# Patient Record
Sex: MALE | Race: BLACK OR AFRICAN AMERICAN | NOT HISPANIC OR LATINO | Employment: FULL TIME | URBAN - METROPOLITAN AREA
[De-identification: names, ages, dates, MRNs, and addresses within clinical notes are randomized per-mention and may not be internally consistent; named-entity substitution may affect disease eponyms.]

---

## 2021-06-16 ENCOUNTER — OFFICE VISIT (OUTPATIENT)
Dept: FAMILY MEDICINE | Facility: CLINIC | Age: 26
End: 2021-06-16

## 2021-06-16 ENCOUNTER — MYC MEDICAL ADVICE (OUTPATIENT)
Dept: FAMILY MEDICINE | Facility: CLINIC | Age: 26
End: 2021-06-16

## 2021-06-16 VITALS
SYSTOLIC BLOOD PRESSURE: 102 MMHG | OXYGEN SATURATION: 98 % | TEMPERATURE: 99 F | WEIGHT: 99 LBS | HEART RATE: 64 BPM | DIASTOLIC BLOOD PRESSURE: 63 MMHG | BODY MASS INDEX: 16.5 KG/M2 | HEIGHT: 65 IN | RESPIRATION RATE: 20 BRPM

## 2021-06-16 DIAGNOSIS — Z11.4 ENCOUNTER FOR SCREENING FOR HIV: ICD-10-CM

## 2021-06-16 DIAGNOSIS — Z29.9 ENCOUNTER FOR PREVENTIVE MEASURE: ICD-10-CM

## 2021-06-16 DIAGNOSIS — L43.9 LICHEN PLANUS: Primary | ICD-10-CM

## 2021-06-16 DIAGNOSIS — R63.0 ANOREXIA: ICD-10-CM

## 2021-06-16 LAB
BASOPHILS # BLD AUTO: 0 10E9/L (ref 0–0.2)
BASOPHILS NFR BLD AUTO: 0.2 %
DIFFERENTIAL METHOD BLD: NORMAL
EOSINOPHIL # BLD AUTO: 0 10E9/L (ref 0–0.7)
EOSINOPHIL NFR BLD AUTO: 0.3 %
ERYTHROCYTE [DISTWIDTH] IN BLOOD BY AUTOMATED COUNT: 11.1 % (ref 10–15)
HCT VFR BLD AUTO: 47.2 % (ref 40–53)
HGB BLD-MCNC: 16 G/DL (ref 13.3–17.7)
LYMPHOCYTES # BLD AUTO: 1 10E9/L (ref 0.8–5.3)
LYMPHOCYTES NFR BLD AUTO: 17 %
MCH RBC QN AUTO: 31.7 PG (ref 26.5–33)
MCHC RBC AUTO-ENTMCNC: 33.9 G/DL (ref 31.5–36.5)
MCV RBC AUTO: 94 FL (ref 78–100)
MONOCYTES # BLD AUTO: 0.3 10E9/L (ref 0–1.3)
MONOCYTES NFR BLD AUTO: 5.9 %
NEUTROPHILS # BLD AUTO: 4.4 10E9/L (ref 1.6–8.3)
NEUTROPHILS NFR BLD AUTO: 76.6 %
PLATELET # BLD AUTO: 195 10E9/L (ref 150–450)
RBC # BLD AUTO: 5.05 10E12/L (ref 4.4–5.9)
WBC # BLD AUTO: 5.8 10E9/L (ref 4–11)

## 2021-06-16 PROCEDURE — 99204 OFFICE O/P NEW MOD 45 MIN: CPT | Performed by: FAMILY MEDICINE

## 2021-06-16 PROCEDURE — 84134 ASSAY OF PREALBUMIN: CPT | Performed by: FAMILY MEDICINE

## 2021-06-16 PROCEDURE — 86803 HEPATITIS C AB TEST: CPT | Performed by: FAMILY MEDICINE

## 2021-06-16 PROCEDURE — 80050 GENERAL HEALTH PANEL: CPT | Performed by: FAMILY MEDICINE

## 2021-06-16 PROCEDURE — 87389 HIV-1 AG W/HIV-1&-2 AB AG IA: CPT | Performed by: FAMILY MEDICINE

## 2021-06-16 PROCEDURE — 36415 COLL VENOUS BLD VENIPUNCTURE: CPT | Performed by: FAMILY MEDICINE

## 2021-06-16 RX ORDER — PANTOPRAZOLE SODIUM 40 MG/1
40 TABLET, DELAYED RELEASE ORAL DAILY
Qty: 30 TABLET | Refills: 1 | Status: SHIPPED | OUTPATIENT
Start: 2021-06-16 | End: 2021-10-19

## 2021-06-16 SDOH — HEALTH STABILITY: MENTAL HEALTH: HOW OFTEN DO YOU HAVE 6 OR MORE DRINKS ON ONE OCCASION?: NEVER

## 2021-06-16 SDOH — HEALTH STABILITY: MENTAL HEALTH: HOW MANY STANDARD DRINKS CONTAINING ALCOHOL DO YOU HAVE ON A TYPICAL DAY?: NOT ASKED

## 2021-06-16 SDOH — HEALTH STABILITY: MENTAL HEALTH: HOW OFTEN DO YOU HAVE A DRINK CONTAINING ALCOHOL?: NEVER

## 2021-06-16 ASSESSMENT — MIFFLIN-ST. JEOR: SCORE: 1359.06

## 2021-06-16 NOTE — ASSESSMENT & PLAN NOTE
Patient reports either 2 months or 3 days of anorexia following his a.m. meal.  He has had vomiting at least twice.  He is underweight.  Broaden database, treat with PPI empirically.

## 2021-06-16 NOTE — PROGRESS NOTES
Assessment & Plan   Problem List Items Addressed This Visit     Anorexia     Patient reports either 2 months or 3 days of anorexia following his a.m. meal.  He has had vomiting at least twice.  He is underweight.  [employer] database, treat with PPI empirically.         Relevant Medications    pantoprazole (PROTONIX) 40 MG EC tablet    Other Relevant Orders    TSH with free T4 reflex (Completed)    CBC with platelets and differential (Completed)    *UA reflex to Microscopic and Culture ([place] and [facility] (except [place] and [place]) (Completed)    Comprehensive metabolic panel (Completed)    Prealbumin (Completed)    Encounter for preventive measure     Routine         Relevant Orders    Chlamydia trachomatis PCR (Completed)    HIV Antigen Antibody Combo (Completed)    Hepatitis C antibody (Completed)    Encounter for screening for HIV     Patient requests this.         Lichen planus - Primary     Extensive skin lesions.  [employer] database, refer         Relevant Orders    ADULT DERMATOLOGY REFERRAL                        Return in about 2 months (around 8/16/2021) for COVID vacc #1 lab appt go.    Valentin Dominguez MD  [facility] VIRGIE Betancourt is a 26 year old who presents for the following health issues     HPI     Rash  Onset/Duration: 6 months  Description  Location: All over body  Character: round, blotchy, raised, painful  Itching: moderate  Intensity:  moderate  Progression of Symptoms:  worsening  Accompanying signs and symptoms:   Fever: no  Body aches or joint pain: no  Sore throat symptoms: some times in morniong  Recent cold symptoms: no  History:           [place]  New exposures:  None  Recent travel: no  Exposure to similar rash: no  Precipitating or alleviating factors: none  Therapies tried and outcome: none        Review of Systems     No fevers.  Rashes been present 2 years, sometimes disappears.  No  "therapies tried.  Occasional itch      Objective    /63 (BP Location: Right arm, Patient Position: Chair, Cuff Size: Adult Regular)   Pulse 64   Temp 99  F (37.2  C) (Oral)   Resp 20   Ht 1.64 m (5' 4.57\")   Wt 44.9 kg (99 lb)   SpO2 98%   BMI 16.70 kg/m    Body mass index is 16.7 kg/m .  Physical Exam     No thyromegaly extensive skin lesions with marginal raised edges and central atrophy    Valentin Dominguez MD                  "

## 2021-06-17 LAB
ALBUMIN SERPL-MCNC: 4.4 G/DL (ref 3.4–5)
ALP SERPL-CCNC: 66 U/L (ref 40–150)
ALT SERPL W P-5'-P-CCNC: 22 U/L (ref 0–70)
ANION GAP SERPL CALCULATED.3IONS-SCNC: 7 MMOL/L (ref 3–14)
AST SERPL W P-5'-P-CCNC: 25 U/L (ref 0–45)
BILIRUB SERPL-MCNC: 1.7 MG/DL (ref 0.2–1.3)
BUN SERPL-MCNC: 11 MG/DL (ref 7–30)
CALCIUM SERPL-MCNC: 9.5 MG/DL (ref 8.5–10.1)
CHLORIDE SERPL-SCNC: 104 MMOL/L (ref 94–109)
CO2 SERPL-SCNC: 28 MMOL/L (ref 20–32)
CREAT SERPL-MCNC: 0.88 MG/DL (ref 0.66–1.25)
GFR SERPL CREATININE-BSD FRML MDRD: >90 ML/MIN/{1.73_M2}
GLUCOSE SERPL-MCNC: 107 MG/DL (ref 70–99)
HCV AB SERPL QL IA: NONREACTIVE
HIV 1+2 AB+HIV1 P24 AG SERPL QL IA: NONREACTIVE
POTASSIUM SERPL-SCNC: 4 MMOL/L (ref 3.4–5.3)
PROT SERPL-MCNC: 8.3 G/DL (ref 6.8–8.8)
SODIUM SERPL-SCNC: 139 MMOL/L (ref 133–144)
TSH SERPL DL<=0.005 MIU/L-ACNC: 1.98 MU/L (ref 0.4–4)

## 2021-06-18 LAB — PREALB SERPL IA-MCNC: 24 MG/DL (ref 15–45)

## 2021-06-27 ENCOUNTER — HEALTH MAINTENANCE LETTER (OUTPATIENT)
Age: 26
End: 2021-06-27

## 2021-10-12 ENCOUNTER — MYC MEDICAL ADVICE (OUTPATIENT)
Dept: FAMILY MEDICINE | Facility: CLINIC | Age: 26
End: 2021-10-12

## 2021-10-13 NOTE — TELEPHONE ENCOUNTER
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  Do you want him to come for recheck or refer to GI?  He was referred to derm for skin.  Please advise.  Jolanta Louis RN

## 2021-10-17 ENCOUNTER — HEALTH MAINTENANCE LETTER (OUTPATIENT)
Age: 26
End: 2021-10-17

## 2021-10-19 ENCOUNTER — OFFICE VISIT (OUTPATIENT)
Dept: FAMILY MEDICINE | Facility: CLINIC | Age: 26
End: 2021-10-19
Payer: COMMERCIAL

## 2021-10-19 VITALS
WEIGHT: 103 LBS | TEMPERATURE: 97.3 F | OXYGEN SATURATION: 99 % | BODY MASS INDEX: 17.37 KG/M2 | SYSTOLIC BLOOD PRESSURE: 102 MMHG | RESPIRATION RATE: 12 BRPM | HEART RATE: 67 BPM | DIASTOLIC BLOOD PRESSURE: 58 MMHG

## 2021-10-19 DIAGNOSIS — R10.32 LLQ ABDOMINAL PAIN: Primary | ICD-10-CM

## 2021-10-19 LAB
BASOPHILS # BLD AUTO: 0 10E3/UL (ref 0–0.2)
BASOPHILS NFR BLD AUTO: 0 %
EOSINOPHIL # BLD AUTO: 0 10E3/UL (ref 0–0.7)
EOSINOPHIL NFR BLD AUTO: 1 %
ERYTHROCYTE [DISTWIDTH] IN BLOOD BY AUTOMATED COUNT: 11 % (ref 10–15)
HCT VFR BLD AUTO: 44.4 % (ref 40–53)
HGB BLD-MCNC: 15.1 G/DL (ref 13.3–17.7)
LYMPHOCYTES # BLD AUTO: 1.4 10E3/UL (ref 0.8–5.3)
LYMPHOCYTES NFR BLD AUTO: 27 %
MCH RBC QN AUTO: 31.3 PG (ref 26.5–33)
MCHC RBC AUTO-ENTMCNC: 34 G/DL (ref 31.5–36.5)
MCV RBC AUTO: 92 FL (ref 78–100)
MONOCYTES # BLD AUTO: 0.5 10E3/UL (ref 0–1.3)
MONOCYTES NFR BLD AUTO: 9 %
NEUTROPHILS # BLD AUTO: 3.2 10E3/UL (ref 1.6–8.3)
NEUTROPHILS NFR BLD AUTO: 63 %
PLATELET # BLD AUTO: 182 10E3/UL (ref 150–450)
RBC # BLD AUTO: 4.82 10E6/UL (ref 4.4–5.9)
WBC # BLD AUTO: 5.1 10E3/UL (ref 4–11)

## 2021-10-19 PROCEDURE — 85025 COMPLETE CBC W/AUTO DIFF WBC: CPT | Performed by: PHYSICIAN ASSISTANT

## 2021-10-19 PROCEDURE — 36415 COLL VENOUS BLD VENIPUNCTURE: CPT | Performed by: PHYSICIAN ASSISTANT

## 2021-10-19 PROCEDURE — 99213 OFFICE O/P EST LOW 20 MIN: CPT | Performed by: PHYSICIAN ASSISTANT

## 2021-10-19 NOTE — PROGRESS NOTES
Assessment & Plan     LLQ abdominal pain    Get CBC. If normal, can get US. If abnormal WBC , will get CT scan.    - CBC with platelets and differential; Future  - CBC with platelets and differential                 No follow-ups on file.    YAHAIRA Olsen [facility]    Subjective     Serafin Graham is a 26 year old male who presents to clinic today for the following health issues:    History of Present Illness       He eats 2-3 servings of fruits and vegetables daily.He consumes 2 sweetened beverage(s) daily.He exercises with enough effort to increase his heart rate 9 or less minutes per day.  He exercises with enough effort to increase his heart rate 3 or less days per week.   He is taking medications regularly.       Abdominal/Flank Pain  Onset/Duration: 2 months  Description:   Character: unable to explain  Location: left lower quadrant  Radiation: None  Intensity: mild  Progression of Symptoms:  worsening  Accompanying Signs & Symptoms:  Fever/Chills: no  Gas/Bloating: no  Nausea: no  Vomitting: no  Diarrhea: no  Constipation: no  Dysuria or Hematuria: no  History:   Trauma: no  Previous similar pain: no  Previous tests done: none  Precipitating factors:   Does the pain change with:     Food: no    Bowel Movement: no    Urination: no   Other factors: worse when laying on left side  Therapies tried and outcome: None- tried protonix previously which was effective      Concern - Follow up Lichen Planus  Onset:  Has been going on since June  Description: sx the worse on abdomen, painful  Intensity: moderate  Progression of Symptoms:  worsening  Therapies tried and outcome: none        Review of Systems   Constitutional, HEENT, cardiovascular, pulmonary, gi and gu systems are negative, except as otherwise noted.        Objective    /58 (BP Location: Right arm, Patient Position: Chair, Cuff Size: Adult Regular)   Pulse 67   Temp 97.3  F (36.3  C) (Oral)   Resp 12    Wt 46.7 kg (103 lb)   SpO2 99%   BMI 17.37 kg/m    Body mass index is 17.37 kg/m .       Physical Exam   GENERAL: healthy, alert and no distress  EYES: Eyes grossly normal to inspection, PERRL and conjunctivae and sclerae normal  RESP: lungs clear to auscultation - no rales, rhonchi or wheezes  CV: regular rate and rhythm, normal S1 S2, no S3 or S4, no murmur, click or rub, no peripheral edema and peripheral pulses strong  ABDOMEN: Mild LLQ pain. No guarding or rebound tenderness. Otherwise soft, nontender, no hepatosplenomegaly, no masses and bowel sounds normal  MS: no gross musculoskeletal defects noted, no edema  SKIN: Lichen planus diffusely on stomach. no other suspicious lesions or rashes  NEURO: Normal strength and tone, mentation intact and speech normal  PSYCH: mentation appears normal, affect normal/bright

## 2021-10-27 ENCOUNTER — HOSPITAL ENCOUNTER (OUTPATIENT)
Dept: ULTRASOUND IMAGING | Facility: CLINIC | Age: 26
Discharge: HOME | End: 2021-10-27
Attending: PHYSICIAN ASSISTANT | Admitting: PHYSICIAN ASSISTANT
Payer: COMMERCIAL

## 2021-10-27 DIAGNOSIS — R10.32 LLQ ABDOMINAL PAIN: ICD-10-CM

## 2021-10-27 PROCEDURE — 76705 ECHO EXAM OF ABDOMEN: CPT

## 2022-02-06 ENCOUNTER — HEALTH MAINTENANCE LETTER (OUTPATIENT)
Age: 27
End: 2022-02-06

## 2022-03-17 ENCOUNTER — OFFICE VISIT (OUTPATIENT)
Dept: URGENT CARE | Facility: URGENT CARE | Age: 27
End: 2022-03-17
Payer: COMMERCIAL

## 2022-03-17 VITALS
HEART RATE: 75 BPM | SYSTOLIC BLOOD PRESSURE: 136 MMHG | WEIGHT: 104 LBS | OXYGEN SATURATION: 99 % | RESPIRATION RATE: 17 BRPM | DIASTOLIC BLOOD PRESSURE: 71 MMHG | TEMPERATURE: 98.7 F | BODY MASS INDEX: 17.54 KG/M2

## 2022-03-17 DIAGNOSIS — L43.9 LICHEN PLANUS: Primary | ICD-10-CM

## 2022-03-17 DIAGNOSIS — R07.89 CHEST TIGHTNESS: ICD-10-CM

## 2022-03-17 LAB
BASOPHILS # BLD AUTO: 0 10E3/UL (ref 0–0.2)
BASOPHILS NFR BLD AUTO: 0 %
EOSINOPHIL # BLD AUTO: 0 10E3/UL (ref 0–0.7)
EOSINOPHIL NFR BLD AUTO: 1 %
ERYTHROCYTE [DISTWIDTH] IN BLOOD BY AUTOMATED COUNT: 11.3 % (ref 10–15)
HCT VFR BLD AUTO: 46.5 % (ref 40–53)
HGB BLD-MCNC: 15.5 G/DL (ref 13.3–17.7)
LYMPHOCYTES # BLD AUTO: 1.2 10E3/UL (ref 0.8–5.3)
LYMPHOCYTES NFR BLD AUTO: 32 %
MCH RBC QN AUTO: 31.4 PG (ref 26.5–33)
MCHC RBC AUTO-ENTMCNC: 33.3 G/DL (ref 31.5–36.5)
MCV RBC AUTO: 94 FL (ref 78–100)
MONOCYTES # BLD AUTO: 0.3 10E3/UL (ref 0–1.3)
MONOCYTES NFR BLD AUTO: 7 %
NEUTROPHILS # BLD AUTO: 2.1 10E3/UL (ref 1.6–8.3)
NEUTROPHILS NFR BLD AUTO: 59 %
PLATELET # BLD AUTO: 197 10E3/UL (ref 150–450)
RBC # BLD AUTO: 4.93 10E6/UL (ref 4.4–5.9)
TROPONIN I SERPL HS-MCNC: 4 NG/L
WBC # BLD AUTO: 3.6 10E3/UL (ref 4–11)

## 2022-03-17 PROCEDURE — 99215 OFFICE O/P EST HI 40 MIN: CPT | Performed by: NURSE PRACTITIONER

## 2022-03-17 PROCEDURE — 93000 ELECTROCARDIOGRAM COMPLETE: CPT | Performed by: NURSE PRACTITIONER

## 2022-03-17 PROCEDURE — 85025 COMPLETE CBC W/AUTO DIFF WBC: CPT | Performed by: NURSE PRACTITIONER

## 2022-03-17 PROCEDURE — 84484 ASSAY OF TROPONIN QUANT: CPT | Performed by: NURSE PRACTITIONER

## 2022-03-17 PROCEDURE — 36415 COLL VENOUS BLD VENIPUNCTURE: CPT | Performed by: NURSE PRACTITIONER

## 2022-03-17 RX ORDER — METHYLPREDNISOLONE 4 MG
TABLET, DOSE PACK ORAL
Qty: 21 TABLET | Refills: 0 | Status: SHIPPED | OUTPATIENT
Start: 2022-03-17 | End: 2022-05-04

## 2022-03-17 NOTE — PROGRESS NOTES
Chief Complaint   Patient presents with     Urgent Care     itchy rash over various parts of body for 3 years.     Urgent Care     chest pain off/on for three weeks.         ICD-10-CM    1. Lichen planus  L43.9 Adult Dermatology Referral     methylPREDNISolone (MEDROL DOSEPAK) 4 MG tablet therapy pack   2. Chest tightness  R07.89 EKG 12-lead complete w/read - Clinics     Troponin I     CBC with platelets and differential     Troponin I     CBC with platelets and differential     Will treat lichen planus with a short course of steroids and have him follow-up with dermatology for ongoing treatment of this chronic disease.    No evidence that chest tightness is cardiac related.  Suspicious for anxiety.  Follow-up with your primary care provider.    42 minutes spent on the date of the encounter doing chart review, history and exam, documentation and further activities per the note    EKG - Reviewed and interpreted by me appears normal, NSR, no acute changes.    Results for orders placed or performed in visit on 03/17/22 (from the past 24 hour(s))   Troponin I   Result Value Ref Range    Troponin I High Sensitivity 4 <79 ng/L   CBC with platelets and differential    Narrative    The following orders were created for panel order CBC with platelets and differential.  Procedure                               Abnormality         Status                     ---------                               -----------         ------                     CBC with platelets and d...[[id number]]  Abnormal            Final result                 Please view results for these tests on the individual orders.   CBC with platelets and differential   Result Value Ref Range    WBC Count 3.6 (L) 4.0 - 11.0 10e3/uL    RBC Count 4.93 4.40 - 5.90 10e6/uL    Hemoglobin 15.5 13.3 - 17.7 g/dL    Hematocrit 46.5 40.0 - 53.0 %    MCV 94 78 - 100 fL    MCH 31.4 26.5 - 33.0 pg    MCHC 33.3 31.5 - 36.5 g/dL    RDW 11.3 10.0 - 15.0 %    Platelet Count 197 150 -  450 10e3/uL    % Neutrophils 59 %    % Lymphocytes 32 %    % Monocytes 7 %    % Eosinophils 1 %    % Basophils 0 %    Absolute Neutrophils 2.1 1.6 - 8.3 10e3/uL    Absolute Lymphocytes 1.2 0.8 - 5.3 10e3/uL    Absolute Monocytes 0.3 0.0 - 1.3 10e3/uL    Absolute Eosinophils 0.0 0.0 - 0.7 10e3/uL    Absolute Basophils 0.0 0.0 - 0.2 10e3/uL       Subjective     Taylor Betanocurt is an 26 year old male who presents to clinic today for rash over entire body that has been present intermittently for years.  The rash never completely goes away but will clear from one area of the body and then reappear on another.  He was seen for this in the past and diagnosed with lichen planus.  He was supposed to follow-up with dermatology but did not make the appointment.    He is also concerned about intermittent chest tightness that he has been having over the last couple of weeks.  It seems to mostly appear in the morning and then clears.      ROS: 10 point ROS neg other than the symptoms noted above in the HPI.       Objective    /71   Pulse 75   Temp 98.7  F (37.1  C) (Tympanic)   Resp 17   Wt 47.2 kg (104 lb)   SpO2 99%   BMI 17.54 kg/m    Nurses notes and VS have been reviewed.    Physical Exam       GENERAL APPEARANCE: healthy appearing, alert     EYES: PERRL, EOMI, sclera non-icteric     HENT: oral exam benign, mucus membranes intact, without ulcers or lesions     NECK: no adenopathy or asymmetry, thyroid normal to palpation     RESP: lungs clear to auscultation - no rales, rhonchi or wheezes     CV: regular rates and rhythm, no murmurs, rubs, or gallop     ABDOMEN:  soft, nontender, no HSM or masses and bowel sounds normal     MS: extremities normal- no gross deformities noted; normal muscle tone.     SKIN: Lichen planus on torso arms and legs     NEURO: Normal strength and tone, mentation intact and speech normal     PSYCH: normal thought process; no significant mood disturbance    Patient Instructions      Patient Education     Understanding Lichen Planus  Lichen planus is a long-term (chronic) skin disease. It s a rash. It is not contagious, so it doesn't spread from person to person. It is not cancer (benign). The rash can develop anywhere on the body. But it most often occurs on the wrists, arms, legs, scalp, and genitals. It may also be seen on the nails and in the mouth. People ages 30 to 60 are more likely to get it.    How to say it  LY-osman PLAY -nuhs  What causes lichen planus?   The cause of lichen planus is often not known. But metals such as gold may trigger it. So may some medicines. These may include blood pressure, heart and arthritis medicines, and medicines to prevent malaria. Some research suggests the disease may also be linked to hepatitis C.   Symptoms of lichen planus      On the skin. Lichen planus causes flat-topped bumps or patches to form. These bumps may be very itchy. They are often shiny reddish or purple in color. They are often straight-edged (or polygonal), not round. They may also have fine white lines on them. Over time, the bumps may form thick patches of rough, scaly skin.    In the mouth. The condition may look like patches of white lace. These are often not painful.    On the genitals. The skin here becomes bright red and raw. Sometimes sores can appear. These can make sex painful.    On the nails. It can cause the nails to become thin, split, and form grooves.    Treatment for lichen planus  Lichen planus often goes away in a few years. It may go away without treatment. To ease itching and improve the look of the rash, treatment options may include:     Steroids. These medicines can be put directly onto the skin or injected into the affected area. They can also be taken by mouth.    Other medicines. Your healthcare provider may give you other medicines, such as an antihistamine or retinoid, to ease itching and pain. Anti-itch creams or ointments may also work. Your provider  might also prescribe other medicines that suppress the immune system.    Phototherapy. This treatment directs ultraviolet light on the skin to help clear it.  Self-care tips for lichen planus    Don t scratch any affected areas. This can sometimes spread the rash.    Use mild soap and moisturizing lotion after bathing.    Having lichen planus in your mouth may raise your risk of getting cancer in your mouth (oral cancer). To reduce that risk:  ? Stop smoking, chewing tobacco, and drinking alcohol.  ? Get an oral cancer screening every 6 to 12 months. You can get this from your dentist or dermatologist.  ? Brush your teeth twice a day.  ? Floss every day.  ? Get a dental checkup and cleaning twice a year.  ? Don't have foods or drinks that can make lichen planus in the mouth worse. This includes spicy foods, citrus fruits and juices (such as oranges and grapefruits), tomatoes and foods made with tomatoes (such as salsa, pasta sauces, and ketchup), crispy and salty snacks (such as corn chips), and drinks that have caffeine (such as coffee, tea, and cola).    When to call your healthcare provider  Call your healthcare provider right away if you have any of these:    Fever of 100.4 F (38 C) or higher, or as directed by your provider    New symptoms    Pain that gets worse    Symptoms that don t get better, or get worse    Mouth sores  [employer] last reviewed this educational content on 6/1/2019 2000-2021 [employer]. All rights reserved. This information is not intended as a substitute for professional medical care. Always follow your healthcare professional's instructions.               RAVINDER Navarro, CNP  [facility]    The use of Dragon/[employer] dictation services may have been used to construct the content in this note; any grammatical or spelling errors are non-intentional. Please contact the author of this note directly if you are in need of any clarification.

## 2022-03-17 NOTE — PATIENT INSTRUCTIONS
Patient Education     Understanding Lichen Planus  Lichen planus is a long-term (chronic) skin disease. It s a rash. It is not contagious, so it doesn't spread from person to person. It is not cancer (benign). The rash can develop anywhere on the body. But it most often occurs on the wrists, arms, legs, scalp, and genitals. It may also be seen on the nails and in the mouth. People ages 30 to 60 are more likely to get it.    How to say it  LY-osman PLAY -nuhs  What causes lichen planus?   The cause of lichen planus is often not known. But metals such as gold may trigger it. So may some medicines. These may include blood pressure, heart and arthritis medicines, and medicines to prevent malaria. Some research suggests the disease may also be linked to hepatitis C.   Symptoms of lichen planus      On the skin. Lichen planus causes flat-topped bumps or patches to form. These bumps may be very itchy. They are often shiny reddish or purple in color. They are often straight-edged (or polygonal), not round. They may also have fine white lines on them. Over time, the bumps may form thick patches of rough, scaly skin.    In the mouth. The condition may look like patches of white lace. These are often not painful.    On the genitals. The skin here becomes bright red and raw. Sometimes sores can appear. These can make sex painful.    On the nails. It can cause the nails to become thin, split, and form grooves.    Treatment for lichen planus  Lichen planus often goes away in a few years. It may go away without treatment. To ease itching and improve the look of the rash, treatment options may include:     Steroids. These medicines can be put directly onto the skin or injected into the affected area. They can also be taken by mouth.    Other medicines. Your healthcare provider may give you other medicines, such as an antihistamine or retinoid, to ease itching and pain. Anti-itch creams or ointments may also work. Your provider  might also prescribe other medicines that suppress the immune system.    Phototherapy. This treatment directs ultraviolet light on the skin to help clear it.  Self-care tips for lichen planus    Don t scratch any affected areas. This can sometimes spread the rash.    Use mild soap and moisturizing lotion after bathing.    Having lichen planus in your mouth may raise your risk of getting cancer in your mouth (oral cancer). To reduce that risk:  ? Stop smoking, chewing tobacco, and drinking alcohol.  ? Get an oral cancer screening every 6 to 12 months. You can get this from your dentist or dermatologist.  ? Brush your teeth twice a day.  ? Floss every day.  ? Get a dental checkup and cleaning twice a year.  ? Don't have foods or drinks that can make lichen planus in the mouth worse. This includes spicy foods, citrus fruits and juices (such as oranges and grapefruits), tomatoes and foods made with tomatoes (such as salsa, pasta sauces, and ketchup), crispy and salty snacks (such as corn chips), and drinks that have caffeine (such as coffee, tea, and cola).    When to call your healthcare provider  Call your healthcare provider right away if you have any of these:    Fever of 100.4 F (38 C) or higher, or as directed by your provider    New symptoms    Pain that gets worse    Symptoms that don t get better, or get worse    Mouth sores  [employer] last reviewed this educational content on 6/1/2019 2000-2021 [employer]. All rights reserved. This information is not intended as a substitute for professional medical care. Always follow your healthcare professional's instructions.

## 2022-04-29 ENCOUNTER — OFFICE VISIT (OUTPATIENT)
Dept: FAMILY MEDICINE | Facility: CLINIC | Age: 27
End: 2022-04-29
Payer: COMMERCIAL

## 2022-04-29 VITALS — SYSTOLIC BLOOD PRESSURE: 102 MMHG | DIASTOLIC BLOOD PRESSURE: 60 MMHG

## 2022-04-29 DIAGNOSIS — R21 RASH: ICD-10-CM

## 2022-04-29 PROCEDURE — 99204 OFFICE O/P NEW MOD 45 MIN: CPT | Mod: 25 | Performed by: NURSE PRACTITIONER

## 2022-04-29 PROCEDURE — 11104 PUNCH BX SKIN SINGLE LESION: CPT | Performed by: NURSE PRACTITIONER

## 2022-04-29 PROCEDURE — 88312 SPECIAL STAINS GROUP 1: CPT | Performed by: DERMATOLOGY

## 2022-04-29 PROCEDURE — 88305 TISSUE EXAM BY PATHOLOGIST: CPT | Performed by: DERMATOLOGY

## 2022-04-29 RX ORDER — TRIAMCINOLONE ACETONIDE 1 MG/G
OINTMENT TOPICAL 2 TIMES DAILY
Qty: 454 G | Refills: 0 | Status: SHIPPED | OUTPATIENT
Start: 2022-04-29 | End: 2022-10-18

## 2022-04-29 NOTE — PROGRESS NOTES
[facility] Dermatology Note  Encounter Date: Apr 29, 2022  Office Visit     Reviewed patients past medical history and pertinent chart review prior to patients visit today.     Dermatology Problem List:  1. Rash widespread, given medrol dose pack 3/17/22 by urgent care. Biopsy 04/29/22     ____________________________________________    Assessment & Plan:     #Rash   Punch biopsy:  Location: right flank. Differential Diagnosis: psoriasis vs eczema vs other. After discussion of benefits and risks including but not limited to bleeding/bruising, pain/swelling, infection, scar, incomplete removal, nerve damage/numbness, recurrence, and non-diagnostic biopsy, written consent, verbal consent and photographs were obtained. Time-out was performed. The area was cleaned with isopropyl alcohol. 0.5mL of 1% lidocaine with epinephrine was injected to obtain adequate anesthesia of the lesion. A 4 mm punch biopsy was performed.  4-0 prolene sutures were utilized to approximate the epidermal edges.  White petroleum jelly/VaselineTM and a bandage was applied to the wound.  Explicit verbal and written wound care instructions were provided.  The patient left the Dermatology Clinic in good condition. The patient was counseled to follow up for suture removal in approximately 10-14 days.     Given triamcinolone 0.1% ointment twice daily to areas of rash on body until biopsy results available for symptom relief. Advised to avoid face, groin and skin folds. Councled about use and side effects of thinning of the skin, striae, erythema, and worsening of rash.        Follow-up: pending path results    Zaria Rey, APRN CNP on 4/29/2022 at 7:03 AM   _______________________________________    CC: Derm Problem (Skin irritation on body, worsened over last 2 years)    HPI:  Mr. Taylor Betancourt is a(n) 27 year old male who presents today as a new patient for a rash that has been ongoing since childhood but has been  worsening for 2 years. It affects everywhere except the palms and feet. Itches and hurts. He was given a medrol dose pack on 3/17/22 that slightly improved his symptoms temporarily    Patient declines  today.  He brought friend with him who is helping him if he does not understand what I am saying.  I asked him if he wanted an  for results and he says if we need to call and we can call his friend Veronica Kieth who is his patient contact in his demographics.       Patient is otherwise feeling well, without additional skin concerns.      Physical Exam:  Vitals: /60   SKIN: Total skin exam was performed. The exam included the head/face, neck, both arms, chest, back, abdomen, both legs, digits and/or nails.   - annular plaques with erythematous and lichenified center and hyperpigmentation on the edges scattered on trunk and extremities, buttock, genitalia and scalp  -many areas of postinflammatory hyperpigmentation scattered throughout these areas from older healed lesions    - No other lesions of concern on areas examined.     Medications:  Current Outpatient Medications   Medication     triamcinolone (KENALOG) 0.1 % external ointment     methylPREDNISolone (MEDROL DOSEPAK) 4 MG tablet therapy pack     No current facility-administered medications for this visit.      Past Medical History:   Patient Active Problem List   Diagnosis     Lichen planus     Anorexia     Encounter for screening for HIV     Encounter for preventive measure     Past Medical History:   Diagnosis Date     Lichen planus 6/16/2021                           EDWIN Pratt, ALY  [address] [place] [address] on close of this encounter.

## 2022-04-29 NOTE — LETTER
4/29/2022         RE: Taylor Betancourt  [address]  [address]  [facility] [address]        Dear Colleague,    Thank you for referring your patient, Taylor Betancourt, to the [facility]. Please see a copy of my visit note below.    [facility] Dermatology Note  Encounter Date: Apr 29, 2022  Office Visit     Reviewed patients past medical history and pertinent chart review prior to patients visit today.     Dermatology Problem List:  1. Rash widespread, given medrol dose pack 3/17/22 by urgent care. Biopsy 04/29/22     ____________________________________________    Assessment & Plan:     #Rash   Punch biopsy:  Location: right flank. Differential Diagnosis: psoriasis vs eczema vs other. After discussion of benefits and risks including but not limited to bleeding/bruising, pain/swelling, infection, scar, incomplete removal, nerve damage/numbness, recurrence, and non-diagnostic biopsy, written consent, verbal consent and photographs were obtained. Time-out was performed. The area was cleaned with isopropyl alcohol. 0.5mL of 1% lidocaine with epinephrine was injected to obtain adequate anesthesia of the lesion. A 4 mm punch biopsy was performed.  4-0 prolene sutures were utilized to approximate the epidermal edges.  White petroleum jelly/VaselineTM and a bandage was applied to the wound.  Explicit verbal and written wound care instructions were provided.  The patient left the Dermatology Clinic in good condition. The patient was counseled to follow up for suture removal in approximately 10-14 days.     Given triamcinolone 0.1% ointment twice daily to areas of rash on body until biopsy results available for symptom relief. Advised to avoid face, groin and skin folds. Councled about use and side effects of thinning of the skin, striae, erythema, and worsening of rash.        Follow-up: pending path results    Zaria Rey, RAVINDER CNP on 4/29/2022 at  7:03 AM   _______________________________________    CC: Derm Problem (Skin irritation on body, worsened over last 2 years)    HPI:  Mr. Taylor Betancourt is a(n) 27 year old male who presents today as a new patient for a rash that has been ongoing since childhood but has been worsening for 2 years. It affects everywhere except the palms and feet. Itches and hurts. He was given a medrol dose pack on 3/17/22 that slightly improved his symptoms temporarily    Patient declines  today.  He brought friend with him who is helping him if he does not understand what I am saying.  I asked him if he wanted an  for results and he says if we need to call and we can call his friend Veronica Keith who is his patient contact in his demographics.       Patient is otherwise feeling well, without additional skin concerns.      Physical Exam:  Vitals: /60   SKIN: Total skin exam was performed. The exam included the head/face, neck, both arms, chest, back, abdomen, both legs, digits and/or nails.   - annular plaques with erythematous and lichenified center and hyperpigmentation on the edges scattered on trunk and extremities, buttock, genitalia and scalp  -many areas of postinflammatory hyperpigmentation scattered throughout these areas from older healed lesions    - No other lesions of concern on areas examined.     Medications:  Current Outpatient Medications   Medication     triamcinolone (KENALOG) 0.1 % external ointment     methylPREDNISolone (MEDROL DOSEPAK) 4 MG tablet therapy pack     No current facility-administered medications for this visit.      Past Medical History:   Patient Active Problem List   Diagnosis     Lichen planus     Anorexia     Encounter for screening for HIV     Encounter for preventive measure     Past Medical History:   Diagnosis Date     Lichen planus 6/16/2021                           CC Oly Pratt CNP  [address] [place] [address] on close of this  encounter.      Again, thank you for allowing me to participate in the care of your patient.        Sincerely,        RAVINDER Masters CNP

## 2022-04-29 NOTE — PATIENT INSTRUCTIONS
Wound Care After a Biopsy    What is a skin biopsy?  A skin biopsy allows the doctor to examine a very small piece of tissue under the microscope to determine the diagnosis and the best treatment for the skin condition. A local anesthetic (numbing medicine)  is injected with a very small needle into the skin area to be tested. A small piece of skin is taken from the area. Sometimes a suture (stitch) is used.     What are the risks of a skin biopsy?  I will experience scar, bleeding, swelling, pain, crusting and redness. I may experience incomplete removal or recurrence. Risks of this procedure are excessive bleeding, bruising, infection, nerve damage, numbness, thick (hypertrophic or keloidal) scar and non-diagnostic biopsy.    How should I care for my wound for the first 24 hours?  Keep the wound dry and covered for 24 hours  If it bleeds, hold direct pressure on the area for 15 minutes. If bleeding does not stop then go to the emergency room  Avoid strenuous exercise the first 1-2 days or as your doctor instructs you    How should I care for the wound after 24 hours?  After 24 hours, remove the bandage  You may bathe or shower as normal  If you had a scalp biopsy, you can shampoo as usual and can use shower water to clean the biopsy site daily  Clean the wound twice a day with gentle soap and water  Do not scrub, be gentle  Apply white petroleum/Vaseline after cleaning the wound with a cotton swab or a clean finger, and keep the site covered with a Bandaid /bandage. Bandages are not necessary with a scalp biopsy  If you are unable to cover the site with a Bandaid /bandage, re-apply ointment 2-3 times a day to keep the site moist. Moisture will help with healing  Avoid strenuous activity for first 1-2 days  Avoid lakes, rivers, pools, and oceans until the stitches are removed or the site is healed    How do I clean my wound?  Wash hands thoroughly with soap or use hand  before all wound care  Clean the  wound with gentle soap and water  Apply white petroleum/Vaseline  to wound after it is clean  Replace the Bandaid /bandage to keep the wound covered for the first few days or as instructed by your doctor  If you had a scalp biopsy, warm shower water to the area on a daily basis should suffice    What should I use to clean my wound?   Cotton-tipped applicators (Qtips )  White petroleum jelly (Vaseline ). Use a clean new container and use Q-tips to apply.  Bandaids   as needed  Gentle soap     How should I care for my wound long term?  Do not get your wound dirty  Keep up with wound care for one week or until the area is healed.  A small scab will form and fall off by itself when the area is completely healed. The area will be red and will become pink in color as it heals. Sun protection is very important for how your scar will turn out. Sunscreen with an SPF 30 or greater is recommended once the area is healed.  If you have stitches, stitches need to be removed in 10-14 days. You may return to our clinic for this or you may have it done locally at your doctor s office.  You should have some soreness but it should be mild and slowly go away over several days. Talk to your doctor about using tylenol for pain,    When should I call my doctor?  If you have increased:   Pain or swelling  Pus or drainage (clear or slightly yellow drainage is ok)  Temperature over 100F  Spreading redness or warmth around wound    When will I hear about my results?  The biopsy results can take 2 weeks to come back.  Your results will automatically release to [employer] before your provider has even reviewed them.  The clinic will call you with the results, send you a [employer] message, or have you schedule a follow-up clinic or phone time to discuss the results.  Contact our clinics if you do not hear from us in 2 weeks.    Who should I call with questions?  [facility]: [phone]  [facility]  [facility]: [phone]  For urgent needs outside of business hours call the [facility] at [phone] and ask for the dermatology resident on call

## 2022-05-04 DIAGNOSIS — L40.9 PSORIASIS: Primary | ICD-10-CM

## 2022-05-04 LAB
PATH REPORT.COMMENTS IMP SPEC: NORMAL
PATH REPORT.COMMENTS IMP SPEC: NORMAL
PATH REPORT.FINAL DX SPEC: NORMAL
PATH REPORT.GROSS SPEC: NORMAL
PATH REPORT.MICROSCOPIC SPEC OTHER STN: NORMAL
PATH REPORT.RELEVANT HX SPEC: NORMAL

## 2022-05-04 RX ORDER — HYDROCORTISONE 25 MG/G
OINTMENT TOPICAL 2 TIMES DAILY
Qty: 30 G | Refills: 1 | Status: SHIPPED | OUTPATIENT
Start: 2022-05-04

## 2022-05-09 ENCOUNTER — ALLIED HEALTH/NURSE VISIT (OUTPATIENT)
Dept: FAMILY MEDICINE | Facility: CLINIC | Age: 27
End: 2022-05-09
Payer: COMMERCIAL

## 2022-05-09 DIAGNOSIS — Z48.02 VISIT FOR SUTURE REMOVAL: Primary | ICD-10-CM

## 2022-05-09 PROCEDURE — 99207 PR NO CHARGE NURSE ONLY: CPT | Performed by: PHYSICIAN ASSISTANT

## 2022-05-09 NOTE — PROGRESS NOTES
Taylor Betancourt presents to the clinic today for removal of sutures.  The patient has had the sutures in place for 10 days.  There has been no history of infection or drainage.  1 suture was seen located on the right flank.  The wound is healing well with no signs of infection.  Tetanus status is up to date.   All sutures and suture were easily removed today.  Routine wound care discussed.  The patient will follow up as needed.    Taylor Betancourt comes into clinic today at the request of Florence Rey Ordering Provider for Suture Removal:  Incision was dry, clean and intact, incision cleansed with wound cleanser and sutures were removed. Pt tolerated the procedure. Benzoin and steristrips were not placed per protocol and pt was instructed to leave them in place for 7-10 days. It is okay to shower with these in place, no need to cover. After this time the strerstrips will start loosen and should be removed.  ..    See OV from 4/29/22    This service provided today was under the supervising provider of the tony Cole, who was available if needed.    Roxane Pham RN

## 2022-06-13 ENCOUNTER — OFFICE VISIT (OUTPATIENT)
Dept: FAMILY MEDICINE | Facility: CLINIC | Age: 27
End: 2022-06-13

## 2022-06-13 VITALS
OXYGEN SATURATION: 96 % | TEMPERATURE: 97.4 F | DIASTOLIC BLOOD PRESSURE: 77 MMHG | BODY MASS INDEX: 16.07 KG/M2 | SYSTOLIC BLOOD PRESSURE: 134 MMHG | WEIGHT: 100 LBS | HEART RATE: 57 BPM | HEIGHT: 66 IN

## 2022-06-13 DIAGNOSIS — K21.9 GASTROESOPHAGEAL REFLUX DISEASE WITHOUT ESOPHAGITIS: Primary | ICD-10-CM

## 2022-06-13 PROCEDURE — 99213 OFFICE O/P EST LOW 20 MIN: CPT | Performed by: PHYSICIAN ASSISTANT

## 2022-06-13 RX ORDER — PANTOPRAZOLE SODIUM 40 MG/1
40 TABLET, DELAYED RELEASE ORAL DAILY
Qty: 90 TABLET | Refills: 0 | Status: SHIPPED | OUTPATIENT
Start: 2022-06-13

## 2022-06-13 NOTE — PROGRESS NOTES
Assessment & Plan     Gastroesophageal reflux disease without esophagitis    Treat with protonix for 3 months. This worked well previously. Has not tried omeprazole so would recommend trying to switch to that in 3 months. Did explain that he needs to find a primary care provider as he was confused why he didn't have refills previously. If not improving, would recommend GI referral.    - pantoprazole (PROTONIX) 40 MG EC tablet; Take 1 tablet (40 mg) by mouth daily                 Return in about 3 months (around 9/13/2022) for Medication check- in [place].    YAHAIRA Olsen [facility] ADELE Vazquez is a 27 year old who presents for the following health issues     History of Present Illness       Reason for visit:  Abdomen pain, appetie, couch, vomit    He eats 0-1 servings of fruits and vegetables daily.He consumes 2 sweetened beverage(s) daily.He exercises with enough effort to increase his heart rate 9 or less minutes per day.  He exercises with enough effort to increase his heart rate 3 or less days per week.   He is taking medications regularly.       Pain History:  Have you seen anyone else for your pain? Yes - urgent care  Where in your body do you have pain? Abdominal/Flank Pain  Onset/Duration: ongoing issue for 2 yrs- comes and goes  Description:   Character: Stabbing  Location: thalia-umbilical region, epigastric  Radiation: None  Intensity: mild  Progression of Symptoms:  worsening and intermittent  Accompanying Signs & Symptoms:  Fever/Chills: no  Gas/Bloating: YES- gas  Nausea: YES  Vomitting: YES  Diarrhea: no  Constipation: no  Dysuria or Hematuria: no  Decreased appetite  History:   Trauma: no  Previous similar pain: no  Previous tests done: ultrasound  Precipitating factors:   Does the pain change with:     Food: YES- better    Bowel Movement: no    Urination: no   Other factors: no  Worse when laying down  Therapies tried and outcome: ibuprofen with no  "relief, protonix in the past which help      Review of Systems   Constitutional, HEENT, cardiovascular, pulmonary, gi and gu systems are negative, except as otherwise noted.        Objective    /77 (BP Location: Right arm, Patient Position: Sitting, Cuff Size: Adult Small)   Pulse 57   Temp 97.4  F (36.3  C) (Oral)   Ht 1.67 m (5' 5.75\")   Wt 45.4 kg (100 lb)   SpO2 96%   BMI 16.26 kg/m    Body mass index is 16.26 kg/m .       Physical Exam   GENERAL: healthy, alert and no distress  EYES: Eyes grossly normal to inspection, PERRL and conjunctivae and sclerae normal  RESP: lungs clear to auscultation - no rales, rhonchi or wheezes  CV: regular rate and rhythm, normal S1 S2, no S3 or S4, no murmur, click or rub, no peripheral edema and peripheral pulses strong  ABDOMEN: soft, nontender, no hepatosplenomegaly, no masses and bowel sounds normal  MS: no gross musculoskeletal defects noted, no edema  SKIN: no suspicious lesions or rashes  NEURO: Normal strength and tone, mentation intact and speech normal  PSYCH: mentation appears normal, affect normal/bright                "

## 2022-07-05 NOTE — ASSESSMENT & PLAN NOTE
Assessment/Plan:       Diagnoses and all orders for this visit:    Hyperglycemia    Osteoarthritis of spine with radiculopathy, lumbar region    Lipoma of left upper extremity    Elevated blood pressure affecting pregnancy in first trimester, antepartum    Elevated hemoglobin A1c      Left upper arm with residual bruising from lipoma removal but she is aware that there will be delayed healing time  Back pain controlled  Tolerating metformin at this time  Will have her return in three months to check her HA1C again  Will have her continue with the phentermine 15 mg until finished and then may increase to 30 mg  Continue with diet and exercise changes  Subjective:      Patient ID: Sosa Scott is a 81 y o  female  Here today for a follow-up  She is with a recent hx of elevated Hemoglobin A1C - started on Metformin and is tolerating it well  Recently also started on Phentermine for weight loss management  Has not gained any weight recently but also has not lost any  The following portions of the patient's history were reviewed and updated as appropriate: allergies, current medications, past family history, past medical history, past social history, past surgical history and problem list     Review of Systems   Constitutional: Negative  HENT: Negative  Respiratory: Negative  Cardiovascular: Negative  Gastrointestinal: Negative  Skin: Positive for wound  Neurological: Negative  All other systems reviewed and are negative  Objective:      /100 (BP Location: Right arm, Patient Position: Sitting, Cuff Size: Standard)   Pulse 90   Temp 98 °F (36 7 °C)   Ht 5' 2" (1 575 m)   Wt 88 5 kg (195 lb 3 2 oz)   SpO2 96%   BMI 35 70 kg/m²          Physical Exam  Constitutional:       Appearance: Normal appearance  Cardiovascular:      Rate and Rhythm: Normal rate and regular rhythm     Pulmonary:      Effort: Pulmonary effort is normal       Breath Extensive skin lesions.  Broaden database, refer   sounds: Normal breath sounds  Skin:         Neurological:      Mental Status: She is alert  Psychiatric:         Mood and Affect: Mood normal          Behavior: Behavior normal          Thought Content:  Thought content normal          Judgment: Judgment normal

## 2022-07-24 ENCOUNTER — HEALTH MAINTENANCE LETTER (OUTPATIENT)
Age: 27
End: 2022-07-24

## 2022-10-03 ENCOUNTER — HEALTH MAINTENANCE LETTER (OUTPATIENT)
Age: 27
End: 2022-10-03

## 2022-10-18 ENCOUNTER — MYC MEDICAL ADVICE (OUTPATIENT)
Dept: FAMILY MEDICINE | Facility: CLINIC | Age: 27
End: 2022-10-18

## 2022-10-18 DIAGNOSIS — L43.9 LICHEN PLANUS: Primary | ICD-10-CM

## 2022-10-18 RX ORDER — TRIAMCINOLONE ACETONIDE 1 MG/G
OINTMENT TOPICAL 2 TIMES DAILY
Qty: 454 G | Refills: 0 | Status: SHIPPED | OUTPATIENT
Start: 2022-10-18 | End: 2023-06-12

## 2023-08-12 ENCOUNTER — HEALTH MAINTENANCE LETTER (OUTPATIENT)
Age: 28
End: 2023-08-12

## 2023-10-19 ENCOUNTER — OFFICE VISIT (OUTPATIENT)
Dept: PEDIATRICS | Facility: CLINIC | Age: 28
End: 2023-10-19
Payer: COMMERCIAL

## 2023-10-19 ENCOUNTER — ANCILLARY PROCEDURE (OUTPATIENT)
Dept: GENERAL RADIOLOGY | Facility: CLINIC | Age: 28
End: 2023-10-19
Attending: INTERNAL MEDICINE
Payer: COMMERCIAL

## 2023-10-19 VITALS
SYSTOLIC BLOOD PRESSURE: 91 MMHG | BODY MASS INDEX: 17.31 KG/M2 | TEMPERATURE: 98.2 F | HEART RATE: 66 BPM | OXYGEN SATURATION: 99 % | WEIGHT: 101.4 LBS | DIASTOLIC BLOOD PRESSURE: 51 MMHG | HEIGHT: 64 IN

## 2023-10-19 DIAGNOSIS — L43.9 LICHEN PLANUS: ICD-10-CM

## 2023-10-19 DIAGNOSIS — R10.32 LLQ ABDOMINAL PAIN: ICD-10-CM

## 2023-10-19 DIAGNOSIS — K59.01 SLOW TRANSIT CONSTIPATION: ICD-10-CM

## 2023-10-19 DIAGNOSIS — R10.32 LLQ ABDOMINAL PAIN: Primary | ICD-10-CM

## 2023-10-19 PROCEDURE — 74018 RADEX ABDOMEN 1 VIEW: CPT | Mod: TC | Performed by: RADIOLOGY

## 2023-10-19 PROCEDURE — 90471 IMMUNIZATION ADMIN: CPT | Performed by: INTERNAL MEDICINE

## 2023-10-19 PROCEDURE — 99214 OFFICE O/P EST MOD 30 MIN: CPT | Mod: 25 | Performed by: INTERNAL MEDICINE

## 2023-10-19 PROCEDURE — 90472 IMMUNIZATION ADMIN EACH ADD: CPT | Performed by: INTERNAL MEDICINE

## 2023-10-19 PROCEDURE — 90715 TDAP VACCINE 7 YRS/> IM: CPT | Performed by: INTERNAL MEDICINE

## 2023-10-19 PROCEDURE — 90686 IIV4 VACC NO PRSV 0.5 ML IM: CPT | Performed by: INTERNAL MEDICINE

## 2023-10-19 RX ORDER — MAGNESIUM CARB/ALUMINUM HYDROX 105-160MG
296 TABLET,CHEWABLE ORAL ONCE
Qty: 296 ML | Refills: 0 | Status: SHIPPED | OUTPATIENT
Start: 2023-10-19 | End: 2023-10-19

## 2023-10-19 RX ORDER — POLYETHYLENE GLYCOL 3350 17 G/17G
1 POWDER, FOR SOLUTION ORAL DAILY
Qty: 578 G | Refills: 1 | Status: SHIPPED | OUTPATIENT
Start: 2023-10-19

## 2023-10-19 RX ORDER — TRIAMCINOLONE ACETONIDE 1 MG/G
OINTMENT TOPICAL 2 TIMES DAILY
Qty: 454 G | Refills: 0 | Status: CANCELLED | OUTPATIENT
Start: 2023-10-19

## 2023-10-19 ASSESSMENT — PAIN SCALES - GENERAL: PAINLEVEL: MILD PAIN (2)

## 2023-10-19 NOTE — PROGRESS NOTES
"    Assessment & Plan   Problem List Items Addressed This Visit          Musculoskeletal and Integumentary    Lichen planus     Other Visit Diagnoses       LLQ abdominal pain    -  Primary    Slow transit constipation        Relevant Medications    polyethylene glycol (MIRALAX) 17 GM/Dose powder         Differential includes constipation, h pylori , gerd/ulcer. First treat for constipation, if not improved in 2-4 weeks, consider h pylori testing (not currently on ppi).                  Andie Felipe MD  [facility] GILBERTO Vazquez is a 28 year old, presenting for the following health issues:  Abdominal Pain        10/19/2023     2:43 PM   Additional Questions   Roomed by mistre   Accompanied by girlfriend         10/19/2023     2:43 PM   Patient Reported Additional Medications   Patient reports taking the following new medications NO       HPI     Reports a twisting pain in the left lower quadrant, comes on at night. Lasts a few minutes. Wakes him up. Has been happening for a few years, no significant change in intensity or frequency lately.     Drives long hours for work.  More likely to happen when he drives long hours. Relieved by stretching out his legs. Has had heartburn, but not lately. Was prescribed pantoprazole once, finished the prescription but it didn't really help.     Also coughing in the morning, with some productive mucus.     Poor appetite in the morning.     BM - not every day. Does have to strain.     Does not stay hydrated well.               Review of Systems         Objective    BP 91/51 (BP Location: Right arm, Patient Position: Sitting)   Pulse 66   Temp 98.2  F (36.8  C) (Tympanic)   Ht 1.62 m (5' 3.78\")   Wt 46 kg (101 lb 6.4 oz)   SpO2 99%   BMI 17.53 kg/m    Body mass index is 17.53 kg/m .  Physical Exam   GENERAL: healthy, alert and no distress  EYES: Eyes grossly normal to inspection, PERRL and conjunctivae and sclerae normal  RESP: lungs clear to " auscultation - no rales, rhonchi or wheezes  CV: regular rate and rhythm, normal S1 S2, no S3 or S4, no murmur, click or rub, no peripheral edema and peripheral pulses strong  ABDOMEN: bowel sounds hypoactive, soft, mild tenderness in LLQ and thalia-umbilical area, where there are also palpable soft masses. No guarding  MS: no gross musculoskeletal defects noted, no edema  SKIN: no suspicious lesions or rashes  NEURO: Normal strength and tone, mentation intact and speech normal  PSYCH: mentation appears normal, affect normal/bright    Xray - Reviewed and interpreted by me.  Moderate stool burden in the rectum and colon

## 2023-10-19 NOTE — PATIENT INSTRUCTIONS
Take the magensium citrate today.  You should have some bowel movements tonight.   Then starting tomorrow use miralax daily.     Follow up in 2-4 weeks.

## 2023-11-29 ENCOUNTER — OFFICE VISIT (OUTPATIENT)
Dept: FAMILY MEDICINE | Facility: CLINIC | Age: 28
End: 2023-11-29
Payer: COMMERCIAL

## 2023-11-29 DIAGNOSIS — L43.9 LICHEN PLANUS: Primary | ICD-10-CM

## 2023-11-29 PROCEDURE — 99213 OFFICE O/P EST LOW 20 MIN: CPT | Performed by: NURSE PRACTITIONER

## 2023-11-29 RX ORDER — TRIAMCINOLONE ACETONIDE 1 MG/G
OINTMENT TOPICAL 2 TIMES DAILY
Qty: 454 G | Refills: 0 | Status: SHIPPED | OUTPATIENT
Start: 2023-11-29 | End: 2024-03-28

## 2023-11-29 ASSESSMENT — PAIN SCALES - GENERAL: PAINLEVEL: NO PAIN (0)

## 2023-11-29 NOTE — PROGRESS NOTES
[facility] Dermatology Note  Encounter Date: Nov 29, 2023  Office Visit     Reviewed patients past medical history and pertinent chart review prior to patients visit today.     Dermatology Problem List:  Lichen planus, triamcinolone 0.1% ointment used with significant improvement.  Considering narrowband phototherapy as adjunctive therapy    ____________________________________________    Assessment & Plan:     #Lichen planus  -Discussed that etiology is unknown.  -Discussed treatment options including topical steroids, phototherapy, and oral therapies.  -Patient would like to continue triamcinolone 0.1% ointment using to affected active areas twice daily until resolved and then stopping.  Patient will use twice daily as needed for flares areas.  Patient understands this is not for use in the face armpits or groin and that side effects include thinning of the skin.  -Continue to moisturize daily with a cream or an ointment based moisturizer  -Order placed for phototherapy 3 times weekly, codes given to patient in AVS to check insurance and number given for scheduling if desires.      Florence Rey, CNP  Dermatology    _______________________________________    CC: Derm Problem (Follow-up for Lichen planus/)    HPI:  Mr. Taylor Betancourt is a(n) 28 year old male who presents today for follow-up  for lichen planus.  We did a biopsy at his last visit on 4/29/2022 at which time biopsy showed lichen planus.  He has been using triamcinolone 0.1% ointment nightly to the areas affected and feels like it has given him significant improvement.  He continues to get a few new spots here and they are mostly on the trunk and extremities and wonders if there is anything that he can do to get rid of it fully.  Patient denies any sores or lesions inside the mouth or in the genitals and declines examination of these areas    Patient is otherwise feeling well, without additional skin concerns.      Physical  Exam:  SKIN: Focused examination of trunk and extremities was performed.  - left ankle and lower leg and very few small less than 1 cm areas on chest and upper back and right thigh    - No other lesions of concern on areas examined.     Medications:  Current Outpatient Medications   Medication    triamcinolone (KENALOG) 0.1 % external ointment    hydrocortisone 2.5 % ointment    pantoprazole (PROTONIX) 40 MG EC tablet    polyethylene glycol (MIRALAX) 17 GM/Dose powder     No current facility-administered medications for this visit.      Past Medical History:   Patient Active Problem List   Diagnosis    Lichen planus    Anorexia    Encounter for screening for HIV    Encounter for preventive measure     Past Medical History:   Diagnosis Date    Lichen planus 6/16/2021       CC No referring provider defined for this encounter. on close of this encounter.

## 2023-11-29 NOTE — LETTER
11/29/2023         RE: Taylor Betancourt  [address]  [facility] [address]        Dear Colleague,    Thank you for referring your patient, Taylor Betancourt, to the [facility] [place]. Please see a copy of my visit note below.    [facility] Dermatology Note  Encounter Date: Nov 29, 2023  Office Visit     Reviewed patients past medical history and pertinent chart review prior to patients visit today.     Dermatology Problem List:  Lichen planus, triamcinolone 0.1% ointment used with significant improvement.  Considering narrowband phototherapy as adjunctive therapy    ____________________________________________    Assessment & Plan:     #Lichen planus  -Discussed that etiology is unknown.  -Discussed treatment options including topical steroids, phototherapy, and oral therapies.  -Patient would like to continue triamcinolone 0.1% ointment using to affected active areas twice daily until resolved and then stopping.  Patient will use twice daily as needed for flares areas.  Patient understands this is not for use in the face armpits or groin and that side effects include thinning of the skin.  -Continue to moisturize daily with a cream or an ointment based moisturizer  -Order placed for phototherapy 3 times weekly, codes given to patient in AVS to check insurance and number given for scheduling if desires.      Florence Rey, CNP  Dermatology    _______________________________________    CC: Derm Problem (Follow-up for Lichen planus/)    HPI:  Mr. Taylor Betancourt is a(n) 28 year old male who presents today for follow-up  for lichen planus.  We did a biopsy at his last visit on 4/29/2022 at which time biopsy showed lichen planus.  He has been using triamcinolone 0.1% ointment nightly to the areas affected and feels like it has given him significant improvement.  He continues to get a few new spots here and they are mostly on the trunk and  extremities and wonders if there is anything that he can do to get rid of it fully.  Patient denies any sores or lesions inside the mouth or in the genitals and declines examination of these areas    Patient is otherwise feeling well, without additional skin concerns.      Physical Exam:  SKIN: Focused examination of trunk and extremities was performed.  - left ankle and lower leg and very few small less than 1 cm areas on chest and upper back and right thigh    - No other lesions of concern on areas examined.     Medications:  Current Outpatient Medications   Medication     triamcinolone (KENALOG) 0.1 % external ointment     hydrocortisone 2.5 % ointment     pantoprazole (PROTONIX) 40 MG EC tablet     polyethylene glycol (MIRALAX) 17 GM/Dose powder     No current facility-administered medications for this visit.      Past Medical History:   Patient Active Problem List   Diagnosis     Lichen planus     Anorexia     Encounter for screening for HIV     Encounter for preventive measure     Past Medical History:   Diagnosis Date     Lichen planus 6/16/2021       CC No referring provider defined for this encounter. on close of this encounter.       Again, thank you for allowing me to participate in the care of your patient.        Sincerely,        RAVINDER Masters CNP

## 2023-11-29 NOTE — PATIENT INSTRUCTIONS
Continue using the triamcinolone 0.1% ointment to only the red or itchy areas. Do not use to normal skin or the older areas that are just dark and flat. When the redness and itching is done then stop using the triamcinolone 0.1% ointment to those areas.     You have been prescribed narrow band UVB phototherapy (light treatments) to treat your rash. Please check insurance coverage prior to scheduling appointments to make sure this is a covered service.     Insurance will want to know your:  Diagnosis code: L43.9 (Lichen planus)  Procedure code:  60358 (narrow band UVB phototherapy)     To set up photo therapy appointments please call [phone]

## 2024-10-05 ENCOUNTER — HEALTH MAINTENANCE LETTER (OUTPATIENT)
Age: 29
End: 2024-10-05

## 2025-01-17 ENCOUNTER — MYC REFILL (OUTPATIENT)
Dept: FAMILY MEDICINE | Facility: CLINIC | Age: 30
End: 2025-01-17
Payer: COMMERCIAL

## 2025-01-17 DIAGNOSIS — L43.9 LICHEN PLANUS: ICD-10-CM

## 2025-01-20 RX ORDER — TRIAMCINOLONE ACETONIDE 1 MG/G
OINTMENT TOPICAL 2 TIMES DAILY
Qty: 454 G | Refills: 0 | OUTPATIENT
Start: 2025-01-20

## 2025-01-27 ENCOUNTER — OFFICE VISIT (OUTPATIENT)
Dept: DERMATOLOGY | Facility: CLINIC | Age: 30
End: 2025-01-27
Payer: COMMERCIAL

## 2025-01-27 DIAGNOSIS — L40.9 PSORIASIS: ICD-10-CM

## 2025-01-27 RX ORDER — TRIAMCINOLONE ACETONIDE 1 MG/G
OINTMENT TOPICAL 2 TIMES DAILY
Qty: 454 G | Refills: 0 | Status: SHIPPED | OUTPATIENT
Start: 2025-01-27

## 2025-01-27 RX ORDER — TRIAMCINOLONE ACETONIDE 1 MG/G
OINTMENT TOPICAL 2 TIMES DAILY
Qty: 454 G | Refills: 0 | Status: SHIPPED | OUTPATIENT
Start: 2025-01-27 | End: 2025-01-27

## 2025-01-27 ASSESSMENT — PAIN SCALES - GENERAL: PAINLEVEL_OUTOF10: NO PAIN (0)

## 2025-01-27 NOTE — LETTER
1/27/2025      Taylor Betancourt  [address]  [facility] [address]      Dear Colleague,    Thank you for referring your patient, Taylor Betancourt, to the [facility]. Please see a copy of my visit note below.    [facility] Dermatology Note  Encounter Date: Jan 27, 2025  Office Visit     Reviewed patients past medical history and pertinent chart review prior to patients visit today.     Dermatology Problem List:  # Psoriasis, s/p biopsy from the right flank on 4/29/2022  - triamcinolone 0.1% ointment, narrowband phototherapy    Social hx: works as [employer]   ____________________________________________    Assessment & Plan:     # Psoriasis  -Condition and treatment options including topical steroid and nonsteroidal medications, phototherapy, and Biologics. Discussed that this is an autoimmune disease and will be an ongoing condition that does not have a cure but we have several treatments for management of the condition. We will continue with the following plan as outlined below:   -Patient would like to proceed with narrowband UVB phototherapy. I would like patient to be seen 3 times weekly if possible for treatments. This should be done for 12 weeks. Order placed.  -Patient to begin applying topical triamcinolone 0.1% ointment to affected areas twice daily for up to 3-4 weeks. Avoid face and skin folds. Thereafter, discontinue and use as needed for flares. Patient states the triamcinolone has been helpful with his rash, and would like a refill. Will hold off on stronger topical corticosteroids for now, but otherwise consider clobetasol and calcipotriene at future follow up appointments if no response.  -Biologics to be considered at future follow up pending response to phototherapy.    Return to clinic in 3 months for re-evaluation.    All risks, benefits and alternatives were discussed with patient.  Patient is in  agreement and understands the assessment and plan.  All questions were answered.  Idania Marr PA-C  [facility] Dermatology  _______________________________________    CC: Psoriasis     HPI:  Mr. Taylor Betancourt is a(n) 29 year old male who presents today as a return patient for evaluation of a rash.  He previously had a biopsy obtained on 4/29/2022 from the right flank which revealed findings consistent with psoriasis.  He was last seen in dermatology on 11/29/2023 at which time he was continued on triamcinolone ointment. They also discussed treatment with phototherapy, which he ultimately did not pursue.    The rash is located throughout his body. This waxes and wanes in severity. He feels the triamcinolone ointment is helpful for his rash, particularly at reducing the itch. He uses this medication frequently during flares until resolution. He requests a refill for this today.    Patient is otherwise feeling well, without additional skin concerns.    Physical Exam:  Vitals: There were no vitals taken for this visit.  SKIN: Focused examination of back, abdomen, chest, neck, face, and upper arms was performed. He declined evaluation of the lower half of his body.  - back, chest, abdomen, neck, and upper extremities, well-demarcated scaly hyperpigmented papules and plaques    - No other lesions of concern on areas examined.     Medications:  Current Outpatient Medications   Medication Sig Dispense Refill     triamcinolone (KENALOG) 0.1 % external ointment Apply topically 2 times daily. To red/itchy rash on body. Do not use to normal skin. Maximum 1 month to any area at a time 454 g 0     hydrocortisone 2.5 % ointment Apply topically 2 times daily (Patient not taking: Reported on 11/29/2023) 30 g 1     pantoprazole (PROTONIX) 40 MG EC tablet Take 1 tablet (40 mg) by mouth daily (Patient not taking: Reported on 10/19/2023) 90 tablet 0     polyethylene glycol (MIRALAX) 17 GM/Dose powder Take 17 g (1 Capful)  by mouth daily (Patient not taking: Reported on 11/29/2023) 578 g 1     No current facility-administered medications for this visit.      Past Medical History:   Patient Active Problem List   Diagnosis     Lichen planus     Anorexia     Encounter for screening for HIV     Encounter for preventive measure     Past Medical History:   Diagnosis Date     Lichen planus 06/16/2021       CC Referred Self, MD  No address on file on close of this encounter.       Again, thank you for allowing me to participate in the care of your patient.        Sincerely,        Yasemin Marr PA-C    Electronically signed

## 2025-01-27 NOTE — PROGRESS NOTES
[facility] Dermatology Note  Encounter Date: Jan 27, 2025  Office Visit     Reviewed patients past medical history and pertinent chart review prior to patients visit today.     Dermatology Problem List:  # Psoriasis, s/p biopsy from the right flank on 4/29/2022  - triamcinolone 0.1% ointment, narrowband phototherapy    Social hx: works as [employer]   ____________________________________________    Assessment & Plan:     # Psoriasis  -Condition and treatment options including topical steroid and nonsteroidal medications, phototherapy, and Biologics. Discussed that this is an autoimmune disease and will be an ongoing condition that does not have a cure but we have several treatments for management of the condition. We will continue with the following plan as outlined below:   -Patient would like to proceed with narrowband UVB phototherapy. I would like patient to be seen 3 times weekly if possible for treatments. This should be done for 12 weeks. Order placed.  -Patient to begin applying topical triamcinolone 0.1% ointment to affected areas twice daily for up to 3-4 weeks. Avoid face and skin folds. Thereafter, discontinue and use as needed for flares. Patient states the triamcinolone has been helpful with his rash, and would like a refill. Will hold off on stronger topical corticosteroids for now, but otherwise consider clobetasol and calcipotriene at future follow up appointments if no response.  -Biologics to be considered at future follow up pending response to phototherapy.    Return to clinic in 3 months for re-evaluation.    All risks, benefits and alternatives were discussed with patient.  Patient is in agreement and understands the assessment and plan.  All questions were answered.  Idania Marr PA-C  [facility] Dermatology  _______________________________________    CC: Psoriasis     HPI:  Mr. Taylor Betancourt is a(n) 29 year old male who presents today as a  return patient for evaluation of a rash.  He previously had a biopsy obtained on 4/29/2022 from the right flank which revealed findings consistent with psoriasis.  He was last seen in dermatology on 11/29/2023 at which time he was continued on triamcinolone ointment. They also discussed treatment with phototherapy, which he ultimately did not pursue.    The rash is located throughout his body. This waxes and wanes in severity. He feels the triamcinolone ointment is helpful for his rash, particularly at reducing the itch. He uses this medication frequently during flares until resolution. He requests a refill for this today.    Patient is otherwise feeling well, without additional skin concerns.    Physical Exam:  Vitals: There were no vitals taken for this visit.  SKIN: Focused examination of back, abdomen, chest, neck, face, and upper arms was performed. He declined evaluation of the lower half of his body.  - back, chest, abdomen, neck, and upper extremities, well-demarcated scaly hyperpigmented papules and plaques    - No other lesions of concern on areas examined.     Medications:  Current Outpatient Medications   Medication Sig Dispense Refill    triamcinolone (KENALOG) 0.1 % external ointment Apply topically 2 times daily. To red/itchy rash on body. Do not use to normal skin. Maximum 1 month to any area at a time 454 g 0    hydrocortisone 2.5 % ointment Apply topically 2 times daily (Patient not taking: Reported on 11/29/2023) 30 g 1    pantoprazole (PROTONIX) 40 MG EC tablet Take 1 tablet (40 mg) by mouth daily (Patient not taking: Reported on 10/19/2023) 90 tablet 0    polyethylene glycol (MIRALAX) 17 GM/Dose powder Take 17 g (1 Capful) by mouth daily (Patient not taking: Reported on 11/29/2023) 578 g 1     No current facility-administered medications for this visit.      Past Medical History:   Patient Active Problem List   Diagnosis    Lichen planus    Anorexia    Encounter for screening for HIV     Encounter for preventive measure     Past Medical History:   Diagnosis Date    Lichen planus 06/16/2021       CC Referred Self, MD  No address on file on close of this encounter.

## 2025-01-28 ENCOUNTER — OFFICE VISIT (OUTPATIENT)
Dept: DERMATOLOGY | Facility: CLINIC | Age: 30
End: 2025-01-28
Payer: COMMERCIAL

## 2025-01-28 DIAGNOSIS — L40.9 PSORIASIS: Primary | ICD-10-CM

## 2025-01-28 PROCEDURE — 96900 ACTINOTHERAPY UV LIGHT: CPT | Performed by: PHYSICIAN ASSISTANT

## 2025-01-28 NOTE — LETTER
1/28/2025      Taylor Betancourt  [address]  [facility] [place] [facility] [address]      Dear Colleague,    Thank you for referring your patient, Taylor Betancourt, to the [facility]. Please see a copy of my visit note below.    NB-UVB treatment for psoriasis (DX)  Areas being treated full body  Treatment # 1  Issues following previous treatment : none  Photoprotection on eyes, lips, nipples  73 mj 0 min 10 seconds today .   Mineral oil applied none (by patient, staff, or none)  Goal 2-3 times weekly for a total of 12 weeks or maintenance dosing for 12 weeks (if this time line has passed we need documentation of maintenance dosing )  Last treatment:  No complications    UVB treatment order placed Idania Marr PA-C.  Pt is due for follow up with provider 04/28/25.    Misha Hernandez      Again, thank you for allowing me to participate in the care of your patient.        Sincerely,        Liliana Miller PA-C    Electronically signed

## 2025-01-28 NOTE — PROGRESS NOTES
NB-UVB treatment for psoriasis (DX)  Areas being treated full body  Treatment # 1  Issues following previous treatment : none  Photoprotection on eyes, lips, nipples  73 mj 0 min 10 seconds today .   Mineral oil applied none (by patient, staff, or none)  Goal 2-3 times weekly for a total of 12 weeks or maintenance dosing for 12 weeks (if this time line has passed we need documentation of maintenance dosing )  Last treatment:  No complications    UVB treatment order placed Idania Marr PA-C.  Pt is due for follow up with provider 04/28/25.    Misha Kerr-[facility]

## 2025-02-03 ENCOUNTER — OFFICE VISIT (OUTPATIENT)
Dept: DERMATOLOGY | Facility: CLINIC | Age: 30
End: 2025-02-03
Payer: COMMERCIAL

## 2025-02-03 DIAGNOSIS — L40.9 PSORIASIS: Primary | ICD-10-CM

## 2025-02-03 NOTE — PROGRESS NOTES
NB-UVB treatment for psoriasis (DX)  Areas being treated full body  Treatment # 2  Issues following previous treatment : none  Photoprotection on eyes, lips, nipples  123.0 mj 0 min  17 seconds today .   Mineral oil applied none (by patient, staff, or none)  Goal 2-3 times weekly for a total of 12 weeks or maintenance dosing for 12 weeks (if this time line has passed we need documentation of maintenance dosing )  Last treatment:  No complications     UVB treatment order placed Idania Marr PA-C.  Pt is due for follow up with provider 04/28/25.    Triston Hightower LPN

## 2025-02-03 NOTE — LETTER
2/3/2025      Taylor Betancourt  [address]  [place] [place] [facility] [address]      Dear Colleague,    Thank you for referring your patient, Taylor Betancourt, to the [facility]. Please see a copy of my visit note below.    NB-UVB treatment for psoriasis (DX)  Areas being treated full body  Treatment # 2  Issues following previous treatment : none  Photoprotection on eyes, lips, nipples  123.0 mj 0 min  17 seconds today .   Mineral oil applied none (by patient, staff, or none)  Goal 2-3 times weekly for a total of 12 weeks or maintenance dosing for 12 weeks (if this time line has passed we need documentation of maintenance dosing )  Last treatment:  No complications     UVB treatment order placed Idania Marr PA-C.  Pt is due for follow up with provider 04/28/25.    Triston Hightower LPN      Again, thank you for allowing me to participate in the care of your patient.        Sincerely,        Bob Gayle MD    Electronically signed

## 2025-02-05 ENCOUNTER — OFFICE VISIT (OUTPATIENT)
Dept: DERMATOLOGY | Facility: CLINIC | Age: 30
End: 2025-02-05
Payer: COMMERCIAL

## 2025-02-05 DIAGNOSIS — L40.9 PSORIASIS: Primary | ICD-10-CM

## 2025-02-05 NOTE — PROGRESS NOTES
NB-UVB treatment for psoriasis (DX)  Areas being treated full body  Treatment # 3  Issues following previous treatment : none  Photoprotection on eyes, lips, nipples  142 mj 0 min  20 seconds today .   Mineral oil applied none (by patient, staff, or none)  Goal 2-3 times weekly for a total of 12 weeks or maintenance dosing for 12 weeks (if this time line has passed we need documentation of maintenance dosing )  Last treatment:  No complications     UVB treatment order placed Idania Marr PA-C.  Pt is due for follow up with provider 04/28/25.    Lise Hooper, CMA

## 2025-02-05 NOTE — LETTER
2/5/2025      Taylor Betancourt  [address]  [place] [address]      Dear Colleague,    Thank you for referring your patient, Taylor Betancourt, to the [facility]. Please see a copy of my visit note below.    NB-UVB treatment for psoriasis (DX)  Areas being treated full body  Treatment # 3  Issues following previous treatment : none  Photoprotection on eyes, lips, nipples  142 mj 0 min  20 seconds today .   Mineral oil applied none (by patient, staff, or none)  Goal 2-3 times weekly for a total of 12 weeks or maintenance dosing for 12 weeks (if this time line has passed we need documentation of maintenance dosing )  Last treatment:  No complications     UVB treatment order placed Idania Marr PA-C.  Pt is due for follow up with provider 04/28/25.    Lise Hooper CMA      Again, thank you for allowing me to participate in the care of your patient.        Sincerely,        Bob Gayle MD    Electronically signed

## 2025-02-10 ENCOUNTER — OFFICE VISIT (OUTPATIENT)
Dept: DERMATOLOGY | Facility: CLINIC | Age: 30
End: 2025-02-10
Payer: COMMERCIAL

## 2025-02-10 DIAGNOSIS — L40.9 PSORIASIS: Primary | ICD-10-CM

## 2025-02-10 NOTE — LETTER
2/10/2025      Taylor Betancourt  [address]  [place] [address]      Dear Colleague,    Thank you for referring your patient, Taylor Betancourt, to the [facility]. Please see a copy of my visit note below.    NB-UVB treatment for psoriasis (DX)  Areas being treated full body  Skin Type 4  Treatment # 4  Issues following previous treatment : none  Photoprotection on eyes, lips, nipples  157 mj 0 min  22 seconds today .   Mineral oil applied none (by patient, staff, or none)  Goal 2-3 times weekly for a total of 12 weeks or maintenance dosing for 12 weeks (if this time line has passed we need documentation of maintenance dosing )  Last treatment:  No complications     UVB treatment order placed Idania Marr PA-C.  Pt is due for follow up with provider 04/28/25.      Again, thank you for allowing me to participate in the care of your patient.        Sincerely,        Bob Gayle MD    Electronically signed Consent (Ear)/Introductory Paragraph: The rationale for Mohs was explained to the patient and consent was obtained. The risks, benefits and alternatives to therapy were discussed in detail. Specifically, the risks of ear deformity, infection, scarring, bleeding, prolonged wound healing, incomplete removal, allergy to anesthesia, nerve injury and recurrence were addressed. Prior to the procedure, the treatment site was clearly identified and confirmed by the patient. All components of Universal Protocol/PAUSE Rule completed.

## 2025-02-10 NOTE — PROGRESS NOTES
NB-UVB treatment for psoriasis (DX)  Areas being treated full body  Skin Type 4  Treatment # 4  Issues following previous treatment : none  Photoprotection on eyes, lips, nipples  157 mj 0 min  22 seconds today .   Mineral oil applied none (by patient, staff, or none)  Goal 2-3 times weekly for a total of 12 weeks or maintenance dosing for 12 weeks (if this time line has passed we need documentation of maintenance dosing )  Last treatment:  No complications     UVB treatment order placed Idania Marr PA-C.  Pt is due for follow up with provider 04/28/25.

## 2025-02-12 ENCOUNTER — OFFICE VISIT (OUTPATIENT)
Dept: DERMATOLOGY | Facility: CLINIC | Age: 30
End: 2025-02-12
Payer: COMMERCIAL

## 2025-02-12 DIAGNOSIS — L40.9 PSORIASIS: Primary | ICD-10-CM

## 2025-02-12 NOTE — LETTER
2/12/2025      Taylor Betancourt  [address]  [place] [facility] [address]      Dear Colleague,    Thank you for referring your patient, Taylor Betancourt, to the [facility]. Please see a copy of my visit note below.    NB-UVB treatment for psoriasis (DX)  Areas being treated full body  Skin Type 4  Treatment # 5  Issues following previous treatment : none  Photoprotection on eyes, lips, nipples  181 mj 0 min  24 seconds today .   Mineral oil applied none (by patient, staff, or none)  Goal 2-3 times weekly for a total of 12 weeks or maintenance dosing for 12 weeks (if this time line has passed we need documentation of maintenance dosing )  Last treatment:  No complications     UVB treatment order placed Idania Marr PA-C.  Pt is due for follow up with provider 04/28/25        Jacquelyn Feldman LPN      Again, thank you for allowing me to participate in the care of your patient.        Sincerely,        Bob Gayle MD    Electronically signed

## 2025-02-12 NOTE — PROGRESS NOTES
NB-UVB treatment for psoriasis (DX)  Areas being treated full body  Skin Type 4  Treatment # 5  Issues following previous treatment : none  Photoprotection on eyes, lips, nipples  181 mj 0 min  24 seconds today .   Mineral oil applied none (by patient, staff, or none)  Goal 2-3 times weekly for a total of 12 weeks or maintenance dosing for 12 weeks (if this time line has passed we need documentation of maintenance dosing )  Last treatment:  No complications     UVB treatment order placed Idania Marr PA-C.  Pt is due for follow up with provider 04/28/25        Jacquelyn Feldman LPN

## 2025-02-19 ENCOUNTER — OFFICE VISIT (OUTPATIENT)
Dept: DERMATOLOGY | Facility: CLINIC | Age: 30
End: 2025-02-19
Payer: COMMERCIAL

## 2025-02-19 DIAGNOSIS — L40.9 PSORIASIS: Primary | ICD-10-CM

## 2025-02-19 NOTE — PROGRESS NOTES
NB-UVB treatment for psoriasis (DX)  Areas being treated full body  Skin Type 4  Treatment # 6  Issues following previous treatment : none  Photoprotection on eyes, lips, nipples  206 mj 0 min  25 seconds today .   Mineral oil applied none (by patient, staff, or none)  Goal 2-3 times weekly for a total of 12 weeks or maintenance dosing for 12 weeks (if this time line has passed we need documentation of maintenance dosing )  Last treatment:  No complications     UVB treatment order placed Idania Marr PA-C.  Pt is due for follow up with provider 04/28/25

## 2025-02-19 NOTE — LETTER
2/19/2025      Taylor Betancourt  [address]  [facility] [address]      Dear Colleague,    Thank you for referring your patient, Taylor Betancourt, to the [facility]. Please see a copy of my visit note below.    NB-UVB treatment for psoriasis (DX)  Areas being treated full body  Skin Type 4  Treatment # 6  Issues following previous treatment : none  Photoprotection on eyes, lips, nipples  206 mj 0 min  25 seconds today .   Mineral oil applied none (by patient, staff, or none)  Goal 2-3 times weekly for a total of 12 weeks or maintenance dosing for 12 weeks (if this time line has passed we need documentation of maintenance dosing )  Last treatment:  No complications     UVB treatment order placed Idania Marr PA-C.  Pt is due for follow up with provider 04/28/25      Again, thank you for allowing me to participate in the care of your patient.        Sincerely,        Bob Gayle MD    Electronically signed

## 2025-02-24 ENCOUNTER — OFFICE VISIT (OUTPATIENT)
Dept: DERMATOLOGY | Facility: CLINIC | Age: 30
End: 2025-02-24
Payer: COMMERCIAL

## 2025-02-24 DIAGNOSIS — L40.9 PSORIASIS: Primary | ICD-10-CM

## 2025-02-24 NOTE — LETTER
2/24/2025      Taylor Betancourt  [address]  [facility] [address]      Dear Colleague,    Thank you for referring your patient, Taylor Betancourt, to the [facility]. Please see a copy of my visit note below.    NB-UVB treatment for psoriasis (DX)  Areas being treated full body  Skin Type 4  Treatment # 6  Issues following previous treatment : none  Photoprotection on eyes, lips, nipples  237 mj 0 min 31 seconds today .   Mineral oil applied none (by patient, staff, or none)  Goal 2-3 times weekly for a total of 12 weeks or maintenance dosing for 12 weeks (if this time line has passed we need documentation of maintenance dosing )  Last treatment:  No complications     UVB treatment order placed Idania Marr PA-C.  Pt is due for follow up with provider 04/28/25    Jacquelyn Feldman LPN    Again, thank you for allowing me to participate in the care of your patient.        Sincerely,        Bob Gayle MD    Electronically signed

## 2025-02-24 NOTE — PROGRESS NOTES
NB-UVB treatment for psoriasis (DX)  Areas being treated full body  Skin Type 4  Treatment # 6  Issues following previous treatment : none  Photoprotection on eyes, lips, nipples  237 mj 0 min 31 seconds today .   Mineral oil applied none (by patient, staff, or none)  Goal 2-3 times weekly for a total of 12 weeks or maintenance dosing for 12 weeks (if this time line has passed we need documentation of maintenance dosing )  Last treatment:  No complications     UVB treatment order placed Idania Marr PA-C.  Pt is due for follow up with provider 04/28/25    Jacquelyn Feldman LPN

## 2025-02-26 ENCOUNTER — OFFICE VISIT (OUTPATIENT)
Dept: DERMATOLOGY | Facility: CLINIC | Age: 30
End: 2025-02-26
Payer: COMMERCIAL

## 2025-02-26 DIAGNOSIS — L40.9 PSORIASIS: Primary | ICD-10-CM

## 2025-02-26 NOTE — PROGRESS NOTES
NB-UVB treatment for psoriasis (DX)  Areas being treated full body  Skin Type 4  Treatment # 7  Issues following previous treatment : none  Photoprotection on eyes, lips, nipples  282 mj 0 min 34 seconds today .   Mineral oil applied none (by patient, staff, or none)  Goal 2-3 times weekly for a total of 12 weeks or maintenance dosing for 12 weeks (if this time line has passed we need documentation of maintenance dosing )  Last treatment:  No complications     UVB treatment order placed Idania Marr PA-C.  Pt is due for follow up with provider 04/28/25    Jacquelyn Feldman LPN

## 2025-02-26 NOTE — LETTER
2/26/2025      Taylor Betancourt  [address]  [facility] [address]      Dear Colleague,    Thank you for referring your patient, Taylor Betancourt, to the [facility]. Please see a copy of my visit note below.    NB-UVB treatment for psoriasis (DX)  Areas being treated full body  Skin Type 4  Treatment # 7  Issues following previous treatment : none  Photoprotection on eyes, lips, nipples  282 mj 0 min 34 seconds today .   Mineral oil applied none (by patient, staff, or none)  Goal 2-3 times weekly for a total of 12 weeks or maintenance dosing for 12 weeks (if this time line has passed we need documentation of maintenance dosing )  Last treatment:  No complications     UVB treatment order placed Idania Marr PA-C.  Pt is due for follow up with provider 04/28/25    Jacquelyn Feldman LPN      Again, thank you for allowing me to participate in the care of your patient.        Sincerely,        Bob Gayle MD    Electronically signed

## 2025-03-03 ENCOUNTER — OFFICE VISIT (OUTPATIENT)
Dept: DERMATOLOGY | Facility: CLINIC | Age: 30
End: 2025-03-03
Payer: COMMERCIAL

## 2025-03-03 DIAGNOSIS — L40.9 PSORIASIS: Primary | ICD-10-CM

## 2025-03-03 NOTE — LETTER
3/3/2025      Taylor Betancourt  [address]  [facility] [address]      Dear Colleague,    Thank you for referring your patient, Taylor Betancourt, to the [facility]. Please see a copy of my visit note below.    NB-UVB treatment for psoriasis (DX)  Areas being treated full body  Skin Type 4  Treatment # 8  Issues following previous treatment : none  Photoprotection on eyes, lips, nipples  330 mj 0 min 37 seconds today .   Mineral oil applied none (by patient, staff, or none)  Goal 2-3 times weekly for a total of 12 weeks or maintenance dosing for 12 weeks (if this time line has passed we need documentation of maintenance dosing )  Last treatment:  No complications     UVB treatment order placed Idania Marr PA-C.  Pt is due for follow up with provider 04/28/25         Again, thank you for allowing me to participate in the care of your patient.        Sincerely,        Yasemin Jameson PA-C    Electronically signed

## 2025-03-03 NOTE — PROGRESS NOTES
NB-UVB treatment for psoriasis (DX)  Areas being treated full body  Skin Type 4  Treatment # 8  Issues following previous treatment : none  Photoprotection on eyes, lips, nipples  330 mj 0 min 37 seconds today .   Mineral oil applied none (by patient, staff, or none)  Goal 2-3 times weekly for a total of 12 weeks or maintenance dosing for 12 weeks (if this time line has passed we need documentation of maintenance dosing )  Last treatment:  No complications     UVB treatment order placed Idania Marr PA-C.  Pt is due for follow up with provider 04/28/25

## 2025-03-12 ENCOUNTER — OFFICE VISIT (OUTPATIENT)
Dept: DERMATOLOGY | Facility: CLINIC | Age: 30
End: 2025-03-12
Payer: COMMERCIAL

## 2025-03-12 DIAGNOSIS — L40.9 PSORIASIS: Primary | ICD-10-CM

## 2025-03-12 NOTE — LETTER
3/12/2025      Taylor Betancourt  [address]  [facility] [address]      Dear Colleague,    Thank you for referring your patient, Taylor Betancourt, to the [facility]. Please see a copy of my visit note below.    NB-UVB treatment for psoriasis (DX)  Areas being treated full body  Skin Type 4  Treatment # 9  Issues following previous treatment : none  Photoprotection on eyes, lips, nipples  335 mj 0 min 36 seconds today .   Mineral oil applied none (by patient, staff, or none)  Goal 2-3 times weekly for a total of 12 weeks or maintenance dosing for 12 weeks (if this time line has passed we need documentation of maintenance dosing )  Last treatment:  No complications     UVB treatment order placed Idania Marr PA-C.  Pt is due for follow up with provider 04/28/25    Jacquelyn Feldman LPN      Again, thank you for allowing me to participate in the care of your patient.        Sincerely,        Yasemin Jameson PA-C    Electronically signed [employer]

## 2025-03-12 NOTE — PROGRESS NOTES
NB-UVB treatment for psoriasis (DX)  Areas being treated full body  Skin Type 4  Treatment # 9  Issues following previous treatment : none  Photoprotection on eyes, lips, nipples  335 mj 0 min 36 seconds today .   Mineral oil applied none (by patient, staff, or none)  Goal 2-3 times weekly for a total of 12 weeks or maintenance dosing for 12 weeks (if this time line has passed we need documentation of maintenance dosing )  Last treatment:  No complications     UVB treatment order placed Idania Marr PA-C.  Pt is due for follow up with provider 04/28/25    Jacquelyn Feldman LPN

## 2025-03-13 ENCOUNTER — NURSE TRIAGE (OUTPATIENT)
Dept: FAMILY MEDICINE | Facility: CLINIC | Age: 30
End: 2025-03-13
Payer: COMMERCIAL

## 2025-03-13 NOTE — TELEPHONE ENCOUNTER
"Nurse Triage SBAR    Situation: Severe abdominal pain    Background: Patient has had on and off abdominal pain for the past couple of months. Has not seen primary care since 10/19/23. Note from that visit states \"Differential includes constipation, h pylori , gerd/ulcer. First treat for constipation, if not improved in 2-4 weeks, consider h pylori testing (not currently on ppi).\"    Assessment: Patient reports abdominal pain that comes and goes. He rates the pain a 7/10 and states that sitting or laying down for extended periods of time worsens the pain. He feels that he is very constipated even with miralax. Denies back pain, diarrhea, fever, urination pain, vomiting.    Recommendation: Disposition is to go to the ER now. Patient and girlfriend agree to disposition and will go to [facility] ER.     Gretta Torres RN 3/13/2025 5:51 PM  [facility]    Reason for Disposition   MILD TO MODERATE constant pain lasting > 2 hours    Additional Information   Negative: Passed out (e.g., fainted, lost consciousness, blacked out and was not responding)   Negative: Shock suspected (e.g., cold/pale/clammy skin, too weak to stand, low BP, rapid pulse)   Negative: Sounds like a life-threatening emergency to the triager   Negative: Followed an abdomen (stomach) injury   Negative: Chest pain   Negative: Pain is mainly in upper abdomen (if needed ask: 'is it mainly above the belly button?')   Negative: Abdomen bloating or swelling are main symptoms   Negative: SEVERE abdominal pain (e.g., excruciating)   Negative: Vomiting red blood or black (coffee ground) material   Negative: Blood in bowel movements  (Exception: Blood on surface of BM with constipation.)   Negative: Black or tarry bowel movements  (Exception: Chronic-unchanged black-grey BMs AND is taking iron pills or Pepto-Bismol.)   Negative: Unable to urinate (or only a few drops) and bladder feels very full   Negative: Pain in scrotum persists > 1 " "hour    Answer Assessment - Initial Assessment Questions  1. LOCATION: \"Where does it hurt?\"       Left side   2. RADIATION: \"Does the pain shoot anywhere else?\" (e.g., chest, back)      Denies   3. ONSET: \"When did the pain begin?\" (Minutes, hours or days ago)       Months ago  4. SUDDEN: \"Gradual or sudden onset?\"      Gradual   5. PATTERN \"Does the pain come and go, or is it constant?\"      Comes and goes  6. SEVERITY: \"How bad is the pain?\"  (e.g., Scale 1-10; mild, moderate, or severe)      7/10  7. RECURRENT SYMPTOM: \"Have you ever had this type of stomach pain before?\" If Yes, ask: \"When was the last time?\" and \"What happened that time?\"       Yes- used miralax- constipated and miralax not helping  8. CAUSE: \"What do you think is causing the stomach pain?\"      Unknown   9. RELIEVING/AGGRAVATING FACTORS: \"What makes it better or worse?\" (e.g., antacids, bending or twisting motion, bowel movement)      Aggravating factors are laying down or sitting for a long time. No relieving factors.   10. OTHER SYMPTOMS: \"Do you have any other symptoms?\" (e.g., back pain, diarrhea, fever, urination pain, vomiting)        Denies back pain, diarrhea, fever, urination pain, vomiting.    Protocols used: Abdominal Pain - Male-A-OH    "

## 2025-03-14 ENCOUNTER — APPOINTMENT (OUTPATIENT)
Dept: CT IMAGING | Facility: CLINIC | Age: 30
End: 2025-03-14
Attending: PHYSICIAN ASSISTANT
Payer: COMMERCIAL

## 2025-03-14 ENCOUNTER — HOSPITAL ENCOUNTER (EMERGENCY)
Facility: CLINIC | Age: 30
Discharge: HOME | End: 2025-03-14
Attending: PHYSICIAN ASSISTANT | Admitting: PHYSICIAN ASSISTANT
Payer: COMMERCIAL

## 2025-03-14 VITALS
RESPIRATION RATE: 16 BRPM | HEART RATE: 64 BPM | OXYGEN SATURATION: 99 % | SYSTOLIC BLOOD PRESSURE: 108 MMHG | TEMPERATURE: 97.8 F | DIASTOLIC BLOOD PRESSURE: 64 MMHG

## 2025-03-14 DIAGNOSIS — D72.819 LEUKOPENIA, UNSPECIFIED TYPE: ICD-10-CM

## 2025-03-14 DIAGNOSIS — K59.00 CONSTIPATION, UNSPECIFIED CONSTIPATION TYPE: ICD-10-CM

## 2025-03-14 DIAGNOSIS — K76.89 LIVER NODULE: ICD-10-CM

## 2025-03-14 LAB
ALBUMIN UR-MCNC: NEGATIVE MG/DL
ANION GAP SERPL CALCULATED.3IONS-SCNC: 10 MMOL/L (ref 7–15)
APPEARANCE UR: CLEAR
BASOPHILS # BLD AUTO: 0 10E3/UL (ref 0–0.2)
BASOPHILS NFR BLD AUTO: 1 %
BILIRUB UR QL STRIP: NEGATIVE
BUN SERPL-MCNC: 12.6 MG/DL (ref 6–20)
CALCIUM SERPL-MCNC: 9.5 MG/DL (ref 8.8–10.4)
CHLORIDE SERPL-SCNC: 102 MMOL/L (ref 98–107)
COLOR UR AUTO: ABNORMAL
CREAT SERPL-MCNC: 0.83 MG/DL (ref 0.67–1.17)
EGFRCR SERPLBLD CKD-EPI 2021: >90 ML/MIN/1.73M2
EOSINOPHIL # BLD AUTO: 0 10E3/UL (ref 0–0.7)
EOSINOPHIL NFR BLD AUTO: 1 %
ERYTHROCYTE [DISTWIDTH] IN BLOOD BY AUTOMATED COUNT: 10.8 % (ref 10–15)
GLUCOSE SERPL-MCNC: 101 MG/DL (ref 70–99)
GLUCOSE UR STRIP-MCNC: NEGATIVE MG/DL
HCO3 SERPL-SCNC: 25 MMOL/L (ref 22–29)
HCT VFR BLD AUTO: 43.5 % (ref 40–53)
HGB BLD-MCNC: 15 G/DL (ref 13.3–17.7)
HGB UR QL STRIP: NEGATIVE
HOLD SPECIMEN: NORMAL
HOLD SPECIMEN: NORMAL
IMM GRANULOCYTES # BLD: 0 10E3/UL
IMM GRANULOCYTES NFR BLD: 0 %
KETONES UR STRIP-MCNC: NEGATIVE MG/DL
LEUKOCYTE ESTERASE UR QL STRIP: NEGATIVE
LYMPHOCYTES # BLD AUTO: 1.3 10E3/UL (ref 0.8–5.3)
LYMPHOCYTES NFR BLD AUTO: 36 %
MCH RBC QN AUTO: 31.6 PG (ref 26.5–33)
MCHC RBC AUTO-ENTMCNC: 34.5 G/DL (ref 31.5–36.5)
MCV RBC AUTO: 92 FL (ref 78–100)
MONOCYTES # BLD AUTO: 0.2 10E3/UL (ref 0–1.3)
MONOCYTES NFR BLD AUTO: 7 %
MUCOUS THREADS #/AREA URNS LPF: PRESENT /LPF
NEUTROPHILS # BLD AUTO: 2.1 10E3/UL (ref 1.6–8.3)
NEUTROPHILS NFR BLD AUTO: 56 %
NITRATE UR QL: NEGATIVE
NRBC # BLD AUTO: 0 10E3/UL
NRBC BLD AUTO-RTO: 0 /100
PH UR STRIP: 6.5 [PH] (ref 5–7)
PLATELET # BLD AUTO: 186 10E3/UL (ref 150–450)
POTASSIUM SERPL-SCNC: 4 MMOL/L (ref 3.4–5.3)
RBC # BLD AUTO: 4.74 10E6/UL (ref 4.4–5.9)
RBC URINE: <1 /HPF
SODIUM SERPL-SCNC: 137 MMOL/L (ref 135–145)
SP GR UR STRIP: 1.01 (ref 1–1.03)
UROBILINOGEN UR STRIP-MCNC: NORMAL MG/DL
WBC # BLD AUTO: 3.7 10E3/UL (ref 4–11)
WBC URINE: 1 /HPF

## 2025-03-14 PROCEDURE — 96374 THER/PROPH/DIAG INJ IV PUSH: CPT | Mod: 59

## 2025-03-14 PROCEDURE — 85025 COMPLETE CBC W/AUTO DIFF WBC: CPT | Performed by: PHYSICIAN ASSISTANT

## 2025-03-14 PROCEDURE — 85004 AUTOMATED DIFF WBC COUNT: CPT | Performed by: EMERGENCY MEDICINE

## 2025-03-14 PROCEDURE — 74177 CT ABD & PELVIS W/CONTRAST: CPT

## 2025-03-14 PROCEDURE — 250N000013 HC RX MED GY IP 250 OP 250 PS 637: Performed by: PHYSICIAN ASSISTANT

## 2025-03-14 PROCEDURE — 250N000011 HC RX IP 250 OP 636: Performed by: PHYSICIAN ASSISTANT

## 2025-03-14 PROCEDURE — 36415 COLL VENOUS BLD VENIPUNCTURE: CPT | Performed by: EMERGENCY MEDICINE

## 2025-03-14 PROCEDURE — 80048 BASIC METABOLIC PNL TOTAL CA: CPT | Performed by: PHYSICIAN ASSISTANT

## 2025-03-14 PROCEDURE — 85041 AUTOMATED RBC COUNT: CPT | Performed by: EMERGENCY MEDICINE

## 2025-03-14 PROCEDURE — 81003 URINALYSIS AUTO W/O SCOPE: CPT | Performed by: PHYSICIAN ASSISTANT

## 2025-03-14 PROCEDURE — 99285 EMERGENCY DEPT VISIT HI MDM: CPT | Mod: 25

## 2025-03-14 PROCEDURE — 85018 HEMOGLOBIN: CPT | Performed by: EMERGENCY MEDICINE

## 2025-03-14 PROCEDURE — 84520 ASSAY OF UREA NITROGEN: CPT | Performed by: PHYSICIAN ASSISTANT

## 2025-03-14 PROCEDURE — 250N000009 HC RX 250: Performed by: PHYSICIAN ASSISTANT

## 2025-03-14 RX ORDER — MAGNESIUM CARB/ALUMINUM HYDROX 105-160MG
296 TABLET,CHEWABLE ORAL ONCE
Status: COMPLETED | OUTPATIENT
Start: 2025-03-14 | End: 2025-03-14

## 2025-03-14 RX ORDER — KETOROLAC TROMETHAMINE 15 MG/ML
15 INJECTION, SOLUTION INTRAMUSCULAR; INTRAVENOUS ONCE
Status: COMPLETED | OUTPATIENT
Start: 2025-03-14 | End: 2025-03-14

## 2025-03-14 RX ORDER — SENNA AND DOCUSATE SODIUM 50; 8.6 MG/1; MG/1
1 TABLET, FILM COATED ORAL 2 TIMES DAILY PRN
Qty: 20 TABLET | Refills: 0 | Status: SHIPPED | OUTPATIENT
Start: 2025-03-14

## 2025-03-14 RX ORDER — IOPAMIDOL 755 MG/ML
120 INJECTION, SOLUTION INTRAVASCULAR ONCE
Status: COMPLETED | OUTPATIENT
Start: 2025-03-14 | End: 2025-03-14

## 2025-03-14 RX ADMIN — KETOROLAC TROMETHAMINE 15 MG: 15 INJECTION, SOLUTION INTRAMUSCULAR; INTRAVENOUS at 19:04

## 2025-03-14 RX ADMIN — MAGNESIUM CITRATE 296 ML: 1.75 LIQUID ORAL at 20:43

## 2025-03-14 RX ADMIN — SODIUM CHLORIDE 54 ML: 9 INJECTION, SOLUTION INTRAVENOUS at 19:21

## 2025-03-14 RX ADMIN — IOPAMIDOL 51 ML: 755 INJECTION, SOLUTION INTRAVENOUS at 19:20

## 2025-03-14 ASSESSMENT — ACTIVITIES OF DAILY LIVING (ADL)
ADLS_ACUITY_SCORE: 41
ADLS_ACUITY_SCORE: 41

## 2025-03-14 ASSESSMENT — COLUMBIA-SUICIDE SEVERITY RATING SCALE - C-SSRS
6. HAVE YOU EVER DONE ANYTHING, STARTED TO DO ANYTHING, OR PREPARED TO DO ANYTHING TO END YOUR LIFE?: NO
1. IN THE PAST MONTH, HAVE YOU WISHED YOU WERE DEAD OR WISHED YOU COULD GO TO SLEEP AND NOT WAKE UP?: NO
2. HAVE YOU ACTUALLY HAD ANY THOUGHTS OF KILLING YOURSELF IN THE PAST MONTH?: NO

## 2025-03-14 NOTE — ED PROVIDER NOTES
Emergency Department Note      History of Present Illness     Chief Complaint   Abdominal Pain      HPI   Taylor Betancourt is a 29 year old male presenting to the ED for abdominal pain. He reports intermittent lower left abdominal pain for the past few years, that has worsened in the past week. He further endorses constipation for which he has taken 2 doses of MiraLAX with no resolution. He has been to the hospital previously for these symptoms, but has not followed up with a gastroenterologist. Denies UTI symptoms, fever, vomiting, or history of abdominal surgeries.  No flank or testicular pain.    Independent Historian   None    Review of External Notes     Past Medical History     Medical History and Problem List   Lichen planus  Anorexia    Medications   Methocarbamol    Surgical History   No surgical history on file.     Physical Exam     Patient Vitals for the past 24 hrs:   BP Temp Temp src Pulse Resp SpO2   03/14/25 2043 108/64 -- -- 64 -- 99 %   03/14/25 1737 121/66 97.8  F (36.6  C) Temporal 64 16 98 %     Physical Exam  Vitals and nursing note reviewed.   Constitutional:       Appearance: He is not diaphoretic.   HENT:      Mouth/Throat:      Mouth: Mucous membranes are moist.      Pharynx: No pharyngeal swelling or oropharyngeal exudate.   Eyes:      General: No scleral icterus.     Extraocular Movements: Extraocular movements intact.   Cardiovascular:      Rate and Rhythm: Normal rate and regular rhythm.      Heart sounds: No murmur heard.     No friction rub. No gallop.   Pulmonary:      Effort: Pulmonary effort is normal. No respiratory distress.      Breath sounds: Normal breath sounds. No stridor. No wheezing, rhonchi or rales.   Abdominal:      Palpations: Abdomen is soft. There is no shifting dullness, hepatomegaly or splenomegaly.      Tenderness: There is abdominal tenderness in the left lower quadrant. There is no right CVA tenderness, left CVA tenderness, guarding or rebound. Negative  signs include McBurney's sign.      Hernia: No hernia is present.   Skin:     General: Skin is warm.      Coloration: Skin is not cyanotic or jaundiced.   Neurological:      Mental Status: He is alert and oriented to person, place, and time.   Psychiatric:         Mood and Affect: Mood normal.         Behavior: Behavior normal.         Diagnostics     Lab Results   Labs Ordered and Resulted from Time of ED Arrival to Time of ED Departure   BASIC METABOLIC PANEL - Abnormal       Result Value    Sodium 137      Potassium 4.0      Chloride 102      Carbon Dioxide (CO2) 25      Anion Gap 10      Urea Nitrogen 12.6      Creatinine 0.83      GFR Estimate >90      Calcium 9.5      Glucose 101 (*)    CBC WITH PLATELETS AND DIFFERENTIAL - Abnormal    WBC Count 3.7 (*)     RBC Count 4.74      Hemoglobin 15.0      Hematocrit 43.5      MCV 92      MCH 31.6      MCHC 34.5      RDW 10.8      Platelet Count 186      % Neutrophils 56      % Lymphocytes 36      % Monocytes 7      % Eosinophils 1      % Basophils 1      % Immature Granulocytes 0      NRBCs per 100 WBC 0      Absolute Neutrophils 2.1      Absolute Lymphocytes 1.3      Absolute Monocytes 0.2      Absolute Eosinophils 0.0      Absolute Basophils 0.0      Absolute Immature Granulocytes 0.0      Absolute NRBCs 0.0     ROUTINE UA WITH MICROSCOPIC REFLEX TO CULTURE - Abnormal    Color Urine Light Yellow      Appearance Urine Clear      Glucose Urine Negative      Bilirubin Urine Negative      Ketones Urine Negative      Specific Gravity Urine 1.013      Blood Urine Negative      pH Urine 6.5      Protein Albumin Urine Negative      Urobilinogen Urine Normal      Nitrite Urine Negative      Leukocyte Esterase Urine Negative      Mucus Urine Present (*)     RBC Urine <1      WBC Urine 1         Imaging   CT Abdomen Pelvis w Contrast   Final Result   IMPRESSION:    1.   Moderately large fecal burden.   2.  Small hepatic hyperenhancing nodules may represent transient hepatic  attenuation differences although FNH or flash enhancing hemangioma is could have a similar appearance.        Independent Interpretation   None    ED Course      Medications Administered   Medications   ketorolac (TORADOL) injection 15 mg (15 mg Intravenous $Given 3/14/25 1904)   iopamidol (ISOVUE-370) solution 120 mL (51 mLs Intravenous $Given 3/14/25 1920)   Saline CT scan flush (54 mLs Intravenous $Given 3/14/25 1921)   magnesium citrate solution 296 mL (296 mLs Oral $Given 3/14/25 2043)       Procedures   Procedures     Discussion of Management   None    ED Course   ED Course as of 03/14/25 2329   Fri Mar 14, 2025   1829 I obtained history and examined the patient as noted above.     2003 Patient feels better.  Discussed CT results.       Additional Documentation  None    Medical Decision Making / Diagnosis     CMS Diagnoses: None    MIPS       None    [facility]   Taylor Betancourt is a 29 year old male presents with acute on chronic abdominal pain.  He did have focal tenderness to his left lower quadrant.  Broad differential considered including but not limited to kidney stones, diverticulitis, bowel obstruction, urinary tract infection, among others.  CT imaging shows no evidence of intra-abdominal infection or concerning pathology such as appendicitis, diverticulitis, kidney stones, bowel obstruction.  He does have increased fecal burden.  I recommend a more aggressive bowel treatment for constipation including increasing MiraLAX and trying Senokot.  Will send him home with some mag citrate.  Recommend he follow-up with primary care and/or gastroenterology as he continues to have recurrent bouts of constipation and abdominal pain.  At this time I feel he safe discharge home his vital signs are normal.  Incidental findings of liver nodules should be followed up with primary care.  Also notable that he is slightly leukopenic I seen this in the past.  I also recommend he follow-up with his primary doctor to have  this rechecked and further looked into.    At this time I feel he safe discharge home.    Disposition   The patient was discharged.     Diagnosis     ICD-10-CM    1. Constipation, unspecified constipation type  K59.00       2. Liver nodule  K76.89       3. Leukopenia, unspecified type  D72.819            Discharge Medications   Discharge Medication List as of 3/14/2025  8:43 PM        START taking these medications    Details   SENNA-docusate sodium (SENNA S) 8.6-50 MG tablet Take 1 tablet by mouth 2 times daily as needed (constipation)., Disp-20 tablet, R-0, Local Print               Scribe Disclosure:  I, Maik Whitaker, am serving as a scribe at 7:04 PM on 3/14/2025 to document services personally performed by Jonah Mckenzie PA-C based on my observations and the provider's statements to me.        Jonah Mckenzie PA-C  03/14/25 3913

## 2025-03-14 NOTE — ED TRIAGE NOTES
"      LLQ abdominal pain that has been ongoing for a \"long time.\" Patient also complains of constipation. Took miralax at home without improvement in symptoms. No nausea, vomiting or diarrhea.   "

## 2025-03-15 NOTE — DISCHARGE INSTRUCTIONS
I want you to increase your MiraLAX to 2-3 times daily  Also utilize over-the-counter senna  I am sending you home with mag citrate.  I want you to drink half of it and if you do not have a bowel movement within the next 1 to 2 hours have the other half.    Incidental findings.  You have a small liver nodule.  This can be followed up with your primary care provider.  Also has noted that you have had slightly low white blood cell count.  The cause of this is unclear.  This is been seen in the past.  Should follow-up with primary care to discuss this.

## 2025-03-19 ENCOUNTER — OFFICE VISIT (OUTPATIENT)
Dept: DERMATOLOGY | Facility: CLINIC | Age: 30
End: 2025-03-19
Payer: COMMERCIAL

## 2025-03-19 DIAGNOSIS — L40.9 PSORIASIS: Primary | ICD-10-CM

## 2025-03-19 NOTE — LETTER
3/19/2025      Taylor Betancourt  [address]  [place] [facility] [address]      Dear Colleague,    Thank you for referring your patient, Taylor Betancourt, to the [facility]. Please see a copy of my visit note below.    NB-UVB treatment for psoriasis (DX)  Areas being treated full body  Skin Type 4  Treatment # 10  Issues following previous treatment : none  Photoprotection on eyes, lips, nipples  383 mj 0 min 44 seconds today .   Mineral oil applied none (by patient, staff, or none)  Goal 2-3 times weekly for a total of 12 weeks or maintenance dosing for 12 weeks (if this time line has passed we need documentation of maintenance dosing )  Last treatment:  No complications     UVB treatment order placed Idania Marr PA-C.  Pt is due for follow up with provider 04/28/25    Jacquelyn Feldman LPN      Again, thank you for allowing me to participate in the care of your patient.        Sincerely,        Bob Gayle MD    Electronically signed

## 2025-03-21 ENCOUNTER — TELEPHONE (OUTPATIENT)
Dept: GASTROENTEROLOGY | Facility: CLINIC | Age: 30
End: 2025-03-21
Payer: COMMERCIAL

## 2025-03-21 NOTE — TELEPHONE ENCOUNTER
[facility] Call Center    Phone Message    May a detailed message be left on voicemail: Yes    Reason for Call: Other: Patient is currently scheduled on 4-22-25, as visit type New GI Urgent. This is outside the expected timeline for this referral. Patient has been added to the waitlist.      Action Taken: Message routed to:  Other: GI REFERRAL TRIAGE POOL     Travel Screening: Not Applicable

## 2025-03-24 ENCOUNTER — OFFICE VISIT (OUTPATIENT)
Dept: DERMATOLOGY | Facility: CLINIC | Age: 30
End: 2025-03-24
Payer: COMMERCIAL

## 2025-03-24 DIAGNOSIS — L40.9 PSORIASIS: Primary | ICD-10-CM

## 2025-03-24 PROCEDURE — 96900 ACTINOTHERAPY UV LIGHT: CPT | Performed by: STUDENT IN AN ORGANIZED HEALTH CARE EDUCATION/TRAINING PROGRAM

## 2025-03-24 NOTE — PROGRESS NOTES
NB-UVB treatment for psoriasis (DX)  Areas being treated full body  Skin Type 4  Treatment # 11  Issues following previous treatment : none  Photoprotection on eyes, lips, nipples  444 mj 0 min 47 seconds today .   Mineral oil applied none (by patient, staff, or none)  Goal 2-3 times weekly for a total of 12 weeks or maintenance dosing for 12 weeks (if this time line has passed we need documentation of maintenance dosing )  Last treatment:  No complications     UVB treatment order placed Idania Marr PA-C.  Pt is due for follow up with provider 04/28/25     Jacquelyn Feldman LPN

## 2025-03-24 NOTE — LETTER
3/24/2025      Taylor Betancourt  [address]  [place] [address]      Dear Colleague,    Thank you for referring your patient, Taylor Betancourt, to the [facility]. Please see a copy of my visit note below.    NB-UVB treatment for psoriasis (DX)  Areas being treated full body  Skin Type 4  Treatment # 11  Issues following previous treatment : none  Photoprotection on eyes, lips, nipples  444 mj 0 min 47 seconds today .   Mineral oil applied none (by patient, staff, or none)  Goal 2-3 times weekly for a total of 12 weeks or maintenance dosing for 12 weeks (if this time line has passed we need documentation of maintenance dosing )  Last treatment:  No complications     UVB treatment order placed Idania Marr PA-C.  Pt is due for follow up with provider 04/28/25     Jacquelyn Feldman LPN      Again, thank you for allowing me to participate in the care of your patient.        Sincerely,        Bob Gayle MD    Electronically signed

## 2025-03-26 ENCOUNTER — OFFICE VISIT (OUTPATIENT)
Dept: DERMATOLOGY | Facility: CLINIC | Age: 30
End: 2025-03-26
Payer: COMMERCIAL

## 2025-03-26 DIAGNOSIS — L40.9 PSORIASIS: Primary | ICD-10-CM

## 2025-03-26 PROCEDURE — 96900 ACTINOTHERAPY UV LIGHT: CPT | Performed by: STUDENT IN AN ORGANIZED HEALTH CARE EDUCATION/TRAINING PROGRAM

## 2025-03-26 NOTE — PROGRESS NOTES
NB-UVB treatment for psoriasis (DX)  Areas being treated full body  Skin Type 4  Treatment # 12  Issues following previous treatment : none  Photoprotection on eyes, lips, nipples  500 mj 0 min 52 seconds today .   Mineral oil applied none (by patient, staff, or none)  Goal 2-3 times weekly for a total of 12 weeks or maintenance dosing for 12 weeks (if this time line has passed we need documentation of maintenance dosing )  Last treatment:  No complications     UVB treatment order placed Idania Marr PA-C.  Pt is due for follow up with provider 04/28/25    Lise Hooper CMA

## 2025-03-26 NOTE — LETTER
3/26/2025      Taylor Betancourt  [address]  [place] [address]      Dear Colleague,    Thank you for referring your patient, Taylor Betancourt, to the [facility]. Please see a copy of my visit note below.    NB-UVB treatment for psoriasis (DX)  Areas being treated full body  Skin Type 4  Treatment # 12  Issues following previous treatment : none  Photoprotection on eyes, lips, nipples  500 mj 0 min 52 seconds today .   Mineral oil applied none (by patient, staff, or none)  Goal 2-3 times weekly for a total of 12 weeks or maintenance dosing for 12 weeks (if this time line has passed we need documentation of maintenance dosing )  Last treatment:  No complications     UVB treatment order placed Idania Marr PA-C.  Pt is due for follow up with provider 04/28/25    Lise Hooper CMA      Again, thank you for allowing me to participate in the care of your patient.        Sincerely,        Yasemin Jameson PA-C    Electronically signed

## 2025-04-02 ENCOUNTER — OFFICE VISIT (OUTPATIENT)
Dept: DERMATOLOGY | Facility: CLINIC | Age: 30
End: 2025-04-02
Payer: COMMERCIAL

## 2025-04-02 DIAGNOSIS — L40.9 PSORIASIS: Primary | ICD-10-CM

## 2025-04-02 NOTE — LETTER
4/2/2025      Taylor Betancourt  [address]  [place] [address]      Dear Colleague,    Thank you for referring your patient, Taylor Betancourt, to the [facility]. Please see a copy of my visit note below.    NB-UVB treatment for psoriasis (DX)  Areas being treated full body  Skin Type 4  Treatment # 13  Issues following previous treatment : none  Photoprotection on eyes, lips, nipples  565 mj  0 min 59 seconds today .   Mineral oil applied none (by patient, staff, or none)  Goal 2-3 times weekly for a total of 12 weeks or maintenance dosing for 12 weeks (if this time line has passed we need documentation of maintenance dosing )  Last treatment:  No complications     UVB treatment order placed Idania Marr PA-C.  Pt is due for follow up with provider 04/28/25    Manisha ESPINOSA       Again, thank you for allowing me to participate in the care of your patient.        Sincerely,        Bob Gayle MD    Electronically signed

## 2025-04-02 NOTE — PROGRESS NOTES
NB-UVB treatment for psoriasis (DX)  Areas being treated full body  Skin Type 4  Treatment # 13  Issues following previous treatment : none  Photoprotection on eyes, lips, nipples  565 mj  0 min 59 seconds today .   Mineral oil applied none (by patient, staff, or none)  Goal 2-3 times weekly for a total of 12 weeks or maintenance dosing for 12 weeks (if this time line has passed we need documentation of maintenance dosing )  Last treatment:  No complications     UVB treatment order placed Idania Marr PA-C.  Pt is due for follow up with provider 04/28/25    Manisha ESPINOSA

## 2025-04-07 ENCOUNTER — OFFICE VISIT (OUTPATIENT)
Dept: DERMATOLOGY | Facility: CLINIC | Age: 30
End: 2025-04-07
Payer: COMMERCIAL

## 2025-04-07 DIAGNOSIS — L40.9 PSORIASIS: Primary | ICD-10-CM

## 2025-04-07 PROCEDURE — 96900 ACTINOTHERAPY UV LIGHT: CPT | Performed by: STUDENT IN AN ORGANIZED HEALTH CARE EDUCATION/TRAINING PROGRAM

## 2025-04-07 NOTE — PROGRESS NOTES
NB-UVB treatment for psoriasis (DX)  Areas being treated full body  Skin Type 4  Treatment # 14  Issues following previous treatment : none  Photoprotection on eyes, lips, nipples  634 mj  1 min 2 seconds today .   Mineral oil applied none (by patient, staff, or none)  Goal 2-3 times weekly for a total of 12 weeks or maintenance dosing for 12 weeks (if this time line has passed we need documentation of maintenance dosing )  Last treatment:  No complications     UVB treatment order placed Idania Marr PA-C.  Pt is due for follow up with provider 04/28/25    Triston Hightower LPN

## 2025-04-07 NOTE — LETTER
4/7/2025      Taylor Betancourt  [address]  [place] [address]      Dear Colleague,    Thank you for referring your patient, Taylor Betancourt, to the [facility]. Please see a copy of my visit note below.    NB-UVB treatment for psoriasis (DX)  Areas being treated full body  Skin Type 4  Treatment # 14  Issues following previous treatment : none  Photoprotection on eyes, lips, nipples  634 mj  1 min 2 seconds today .   Mineral oil applied none (by patient, staff, or none)  Goal 2-3 times weekly for a total of 12 weeks or maintenance dosing for 12 weeks (if this time line has passed we need documentation of maintenance dosing )  Last treatment:  No complications     UVB treatment order placed Idania Marr PA-C.  Pt is due for follow up with provider 04/28/25    Triston Hightower LPN      Again, thank you for allowing me to participate in the care of your patient.        Sincerely,        Bob Gayle MD    Electronically signed

## 2025-04-09 ENCOUNTER — OFFICE VISIT (OUTPATIENT)
Dept: GASTROENTEROLOGY | Facility: CLINIC | Age: 30
End: 2025-04-09
Payer: COMMERCIAL

## 2025-04-09 VITALS
WEIGHT: 106.6 LBS | SYSTOLIC BLOOD PRESSURE: 106 MMHG | HEART RATE: 65 BPM | DIASTOLIC BLOOD PRESSURE: 72 MMHG | BODY MASS INDEX: 18.42 KG/M2 | OXYGEN SATURATION: 99 %

## 2025-04-09 DIAGNOSIS — K59.00 CONSTIPATION, UNSPECIFIED CONSTIPATION TYPE: ICD-10-CM

## 2025-04-09 DIAGNOSIS — R10.32 LLQ ABDOMINAL PAIN: ICD-10-CM

## 2025-04-09 DIAGNOSIS — K21.9 GASTROESOPHAGEAL REFLUX DISEASE WITHOUT ESOPHAGITIS: Primary | ICD-10-CM

## 2025-04-09 DIAGNOSIS — K59.01 SLOW TRANSIT CONSTIPATION: ICD-10-CM

## 2025-04-09 LAB
ALBUMIN SERPL BCG-MCNC: 4.1 G/DL (ref 3.5–5.2)
ALP SERPL-CCNC: 69 U/L (ref 40–150)
ALT SERPL W P-5'-P-CCNC: 26 U/L (ref 0–70)
ANION GAP SERPL CALCULATED.3IONS-SCNC: 10 MMOL/L (ref 7–15)
AST SERPL W P-5'-P-CCNC: 34 U/L (ref 0–45)
BASOPHILS # BLD AUTO: 0 10E3/UL (ref 0–0.2)
BASOPHILS NFR BLD AUTO: 0 %
BILIRUB SERPL-MCNC: 1.3 MG/DL
BUN SERPL-MCNC: 11.4 MG/DL (ref 6–20)
CALCIUM SERPL-MCNC: 9.4 MG/DL (ref 8.8–10.4)
CHLORIDE SERPL-SCNC: 101 MMOL/L (ref 98–107)
CREAT SERPL-MCNC: 0.78 MG/DL (ref 0.67–1.17)
CRP SERPL-MCNC: <3 MG/L
EGFRCR SERPLBLD CKD-EPI 2021: >90 ML/MIN/1.73M2
EOSINOPHIL # BLD AUTO: 0.1 10E3/UL (ref 0–0.7)
EOSINOPHIL NFR BLD AUTO: 2 %
ERYTHROCYTE [DISTWIDTH] IN BLOOD BY AUTOMATED COUNT: 11.1 % (ref 10–15)
GLUCOSE SERPL-MCNC: 82 MG/DL (ref 70–99)
HCO3 SERPL-SCNC: 27 MMOL/L (ref 22–29)
HCT VFR BLD AUTO: 45.4 % (ref 40–53)
HGB BLD-MCNC: 15.3 G/DL (ref 13.3–17.7)
IMM GRANULOCYTES # BLD: 0 10E3/UL
IMM GRANULOCYTES NFR BLD: 0 %
LYMPHOCYTES # BLD AUTO: 1.3 10E3/UL (ref 0.8–5.3)
LYMPHOCYTES NFR BLD AUTO: 38 %
MCH RBC QN AUTO: 31.7 PG (ref 26.5–33)
MCHC RBC AUTO-ENTMCNC: 33.7 G/DL (ref 31.5–36.5)
MCV RBC AUTO: 94 FL (ref 78–100)
MONOCYTES # BLD AUTO: 0.3 10E3/UL (ref 0–1.3)
MONOCYTES NFR BLD AUTO: 10 %
NEUTROPHILS # BLD AUTO: 1.7 10E3/UL (ref 1.6–8.3)
NEUTROPHILS NFR BLD AUTO: 49 %
NRBC # BLD AUTO: 0 10E3/UL
NRBC BLD AUTO-RTO: 0 /100
PLATELET # BLD AUTO: 182 10E3/UL (ref 150–450)
POTASSIUM SERPL-SCNC: 3.9 MMOL/L (ref 3.4–5.3)
PROT SERPL-MCNC: 7.5 G/DL (ref 6.4–8.3)
RBC # BLD AUTO: 4.83 10E6/UL (ref 4.4–5.9)
SODIUM SERPL-SCNC: 138 MMOL/L (ref 135–145)
T4 FREE SERPL-MCNC: 1.18 NG/DL (ref 0.9–1.7)
TSH SERPL DL<=0.005 MIU/L-ACNC: 5.15 UIU/ML (ref 0.3–4.2)
WBC # BLD AUTO: 3.4 10E3/UL (ref 4–11)

## 2025-04-09 RX ORDER — POLYETHYLENE GLYCOL 3350 17 G/17G
1 POWDER, FOR SOLUTION ORAL DAILY
Qty: 578 G | Refills: 1 | Status: SHIPPED | OUTPATIENT
Start: 2025-04-09

## 2025-04-09 RX ORDER — PANTOPRAZOLE SODIUM 40 MG/1
40 TABLET, DELAYED RELEASE ORAL DAILY
Qty: 60 TABLET | Refills: 0 | Status: SHIPPED | OUTPATIENT
Start: 2025-04-09

## 2025-04-09 ASSESSMENT — PAIN SCALES - GENERAL: PAINLEVEL_OUTOF10: NO PAIN (0)

## 2025-04-09 NOTE — LETTER
4/9/2025      Taylor Betancourt  [address]  [place] [address]      Dear Colleague,    Thank you for referring your patient, Taylor Betancourt, to the [facility]. Please see a copy of my visit note below.    NEW PATIENT GI CLINIC VISIT    CC/REFERRING MD:  Referred Self  REASON FOR CONSULTATION:   Referred Self for   Chief Complaint   Patient presents with     New Patient     New consult for acid reflux, LLQ abdominal pain and constipation.       ASSESSMENT/PLAN: Patient here for evaluation of recurring left lower quadrant abdominal pain with associated constipation over the past several years.  Also has some issues with recurrent GERD symptoms, possibly related to some dietary indiscretions.  No specific alarm symptoms, will plan for celiac serologies and inflammatory markers.  For now, will start MiraLAX and pantoprazole along with dietary modifications, pushing fiber and starting anti-GERD precautions.  Will consider endoscopic examination depending on lab results, follow-up in 3 months.    We also note these hepatic hyperenhancing nodules, likely benign, can consider nonemergent liver MRI.    1. Slow transit constipation  - polyethylene glycol (MIRALAX) 17 GM/Dose powder; Take 17 g (1 Capful) by mouth daily.  Dispense: 578 g; Refill: 1  - CBC with platelets and differential; Future  - Comprehensive metabolic panel (BMP + Alb, Alk Phos, ALT, AST, Total. Bili, TP); Future  - TSH with free T4 reflex; Future  - CBC with platelets and differential  - Comprehensive metabolic panel (BMP + Alb, Alk Phos, ALT, AST, Total. Bili, TP)  - TSH with free T4 reflex    2. Gastroesophageal reflux disease without esophagitis (Primary)  - pantoprazole (PROTONIX) 40 MG EC tablet; Take 1 tablet (40 mg) by mouth daily.  Dispense: 60 tablet; Refill: 0    3. LLQ abdominal pain  - Tissue transglutaminase toro IgA and IgG; Future  - IgA; Future  - CRP, inflammation; Future  -  Calprotectin Feces; Future  - Calprotectin Feces  - Tissue transglutaminase toro IgA and IgG  - IgA  - CRP, inflammation        RTC 3 months    Thank you for this consultation.  It was a pleasure to participate in the care of this patient; please contact us with any further questions.      20 minutes spent on the date of the encounter doing chart review, patient visit, and documentation    This note was created with voice recognition software, and while reviewed for accuracy, typos may remain.     Albert Tom PA-C  Division of Gastroenterology, Hepatology and Nutrition  [facility]  Taylor Betancourt is a 30 year old male that is seen as a new patient in the GI clinic today for follow-up from recent ER visit.  To review,follow up from recent ER visit. To  review, the patient went to the ER last month with acute on chronic LLQ pain and associated constipation. Has been a recurring problem for several months. CBC with mild leukopenia, normal BMP, and CT with large stool burden but otherwise no acute findings. Some liver lesions noted as well. He was treated with laxatives and he notes this did help his symptoms temporarily.  Currently, not using any bowel regimen.  Has had some recurrences of the left-sided pain, typically more bothersome at night, no nausea or vomiting.  No blood in the stool.  Diet somewhat varies, will eat fast food fairly often, not getting fruits and vegetables every day.  Drinks soda but has been drinking more water lately.  No pertinent surgical history or family medical history of digestive diseases.  No tobacco, alcohol, or drug use.  Has also had recurrent GERD symptoms dating back multiple years.  Recalls being on pantoprazole in the past and thinks it helps.  No dysphagia, odynophagia, early satiety.  Eats spicy foods fairly regularly.    ROS:    10 point ROS neg other than the symptoms noted above in the HPI.    PREVIOUS  ENDOSCOPY:  None    PERTINENT RELEVANT IMAGING OR LABS:  Reviewed    ALLERGIES:   No Known Allergies    PERTINENT MEDICATIONS:    Current Outpatient Medications:      pantoprazole (PROTONIX) 40 MG EC tablet, Take 1 tablet (40 mg) by mouth daily., Disp: 60 tablet, Rfl: 0     polyethylene glycol (MIRALAX) 17 GM/Dose powder, Take 17 g (1 Capful) by mouth daily., Disp: 578 g, Rfl: 1     triamcinolone (KENALOG) 0.1 % external ointment, Apply topically 2 times daily. To red/itchy rash on body. Do not use to normal skin. Maximum 3-4 weeks to any area at a time. Please do not apply to face or skin folds., Disp: 454 g, Rfl: 0    PROBLEM LIST  Patient Active Problem List    Diagnosis Date Noted     Lichen planus 06/16/2021     Priority: Medium     Anorexia 06/16/2021     Priority: Medium     Encounter for screening for HIV 06/16/2021     Priority: Medium     Encounter for preventive measure 06/16/2021     Priority: Medium       PERTINENT PAST MEDICAL HISTORY:  Past Medical History:   Diagnosis Date     Lichen planus 06/16/2021       PREVIOUS SURGERIES:  Past Surgical History:   Procedure Laterality Date     NO HISTORY OF SURGERY         SOCIAL HISTORY:  Social History     Socioeconomic History     Marital status: Single     Spouse name: Not on file     Number of children: Not on file     Years of education: Not on file     Highest education level: Not on file   Occupational History     Not on file   Tobacco Use     Smoking status: Never     Smokeless tobacco: Never   Vaping Use     Vaping status: Never Used   Substance and Sexual Activity     Alcohol use: Not Currently     Drug use: Never     Sexual activity: Yes     Partners: Female   Other Topics Concern     Not on file   Social History Narrative     Not on file     Social Drivers of Health     Financial Resource Strain: Low Risk  (10/19/2023)    Financial Resource Strain      Within the past 12 months, have you or your family members you live with been unable to get  utilities (heat, electricity) when it was really needed?: No   Food Insecurity: Low Risk  (10/19/2023)    Food Insecurity      Within the past 12 months, did you worry that your food would run out before you got money to buy more?: No      Within the past 12 months, did the food you bought just not last and you didn t have money to get more?: No   Transportation Needs: Low Risk  (10/19/2023)    Transportation Needs      Within the past 12 months, has lack of transportation kept you from medical appointments, getting your medicines, non-medical meetings or appointments, work, or from getting things that you need?: No   Physical Activity: Not on file   Stress: Not on file   Social Connections: Not on file   Interpersonal Safety: Not on file   Housing Stability: Low Risk  (10/19/2023)    Housing Stability      Do you have housing? : Yes      Are you worried about losing your housing?: No       FAMILY HISTORY:  No family history on file.    Past/family/social history reviewed and no changes    PHYSICAL EXAMINATION:  Constitutional: aaox3, cooperative, pleasant, not dyspneic/diaphoretic, no acute distress  Vitals reviewed: /72 (BP Location: Left arm, Patient Position: Sitting, Cuff Size: Adult Regular)   Pulse 65   Wt 48.4 kg (106 lb 9.6 oz)   SpO2 99%   BMI 18.42 kg/m    Wt:   Wt Readings from Last 2 Encounters:   04/09/25 48.4 kg (106 lb 9.6 oz)   10/19/23 46 kg (101 lb 6.4 oz)      Eyes: Sclera anicteric/injected  CV: No edema  Respiratory: Unlabored breathing  Skin: warm, perfused, no jaundice  Psych: Normal affect  MSK: Normal gait                      Again, thank you for allowing me to participate in the care of your patient.        Sincerely,        Albert Tom PA-C    Electronically signed

## 2025-04-09 NOTE — NURSING NOTE
Chief Complaint   Patient presents with    New Patient     New consult for acid reflux, LLQ abdominal pain and constipation.     Vitals:    04/09/25 1248   BP: 106/72   BP Location: Left arm   Patient Position: Sitting   Cuff Size: Adult Regular   Pulse: 65   SpO2: 99%   Weight: 48.4 kg (106 lb 9.6 oz)     Body mass index is 18.42 kg/m .    Do you have a history of colon cancer in your immediate family? NO    Medications were reconciled.          Cathy Meyer, CMA

## 2025-04-09 NOTE — PATIENT INSTRUCTIONS
- Start 1 tablet of pantoprazole 30 minutes before breakfast daily for the next 2 months. This should improve the reflux symptoms    - Start 1 capful of miralax each night to assist with constipation management    - Increase fiber (see attached diet information)    - Complete labs and stool tests. I will follow up with results.    - Follow up in 3 months

## 2025-04-09 NOTE — PROGRESS NOTES
NEW PATIENT GI CLINIC VISIT    CC/REFERRING MD:  Referred Self  REASON FOR CONSULTATION:   Referred Self for   Chief Complaint   Patient presents with    New Patient     New consult for acid reflux, LLQ abdominal pain and constipation.       ASSESSMENT/PLAN: Patient here for evaluation of recurring left lower quadrant abdominal pain with associated constipation over the past several years.  Also has some issues with recurrent GERD symptoms, possibly related to some dietary indiscretions.  No specific alarm symptoms, will plan for celiac serologies and inflammatory markers.  For now, will start MiraLAX and pantoprazole along with dietary modifications, pushing fiber and starting anti-GERD precautions.  Will consider endoscopic examination depending on lab results, follow-up in 3 months.    We also note these hepatic hyperenhancing nodules, likely benign, can consider nonemergent liver MRI.    1. Slow transit constipation  - polyethylene glycol (MIRALAX) 17 GM/Dose powder; Take 17 g (1 Capful) by mouth daily.  Dispense: 578 g; Refill: 1  - CBC with platelets and differential; Future  - Comprehensive metabolic panel (BMP + Alb, Alk Phos, ALT, AST, Total. Bili, TP); Future  - TSH with free T4 reflex; Future  - CBC with platelets and differential  - Comprehensive metabolic panel (BMP + Alb, Alk Phos, ALT, AST, Total. Bili, TP)  - TSH with free T4 reflex    2. Gastroesophageal reflux disease without esophagitis (Primary)  - pantoprazole (PROTONIX) 40 MG EC tablet; Take 1 tablet (40 mg) by mouth daily.  Dispense: 60 tablet; Refill: 0    3. LLQ abdominal pain  - Tissue transglutaminase toro IgA and IgG; Future  - IgA; Future  - CRP, inflammation; Future  - Calprotectin Feces; Future  - Calprotectin Feces  - Tissue transglutaminase toro IgA and IgG  - IgA  - CRP, inflammation        RTC 3 months    Thank you for this consultation.  It was a pleasure to participate in the care of this patient; please contact us with any further  questions.      20 minutes spent on the date of the encounter doing chart review, patient visit, and documentation    This note was created with voice recognition software, and while reviewed for accuracy, typos may remain.     Albert Tom PA-C  Division of Gastroenterology, Hepatology and Nutrition  [facility]  Taylor Betancourt is a 30 year old male that is seen as a new patient in the GI clinic today for follow-up from recent ER visit.  To review,follow up from recent ER visit. To  review, the patient went to the ER last month with acute on chronic LLQ pain and associated constipation. Has been a recurring problem for several months. CBC with mild leukopenia, normal BMP, and CT with large stool burden but otherwise no acute findings. Some liver lesions noted as well. He was treated with laxatives and he notes this did help his symptoms temporarily.  Currently, not using any bowel regimen.  Has had some recurrences of the left-sided pain, typically more bothersome at night, no nausea or vomiting.  No blood in the stool.  Diet somewhat varies, will eat fast food fairly often, not getting fruits and vegetables every day.  Drinks soda but has been drinking more water lately.  No pertinent surgical history or family medical history of digestive diseases.  No tobacco, alcohol, or drug use.  Has also had recurrent GERD symptoms dating back multiple years.  Recalls being on pantoprazole in the past and thinks it helps.  No dysphagia, odynophagia, early satiety.  Eats spicy foods fairly regularly.    ROS:    10 point ROS neg other than the symptoms noted above in the HPI.    PREVIOUS ENDOSCOPY:  None    PERTINENT RELEVANT IMAGING OR LABS:  Reviewed    ALLERGIES:   No Known Allergies    PERTINENT MEDICATIONS:    Current Outpatient Medications:     pantoprazole (PROTONIX) 40 MG EC tablet, Take 1 tablet (40 mg) by mouth daily., Disp: 60 tablet, Rfl: 0     polyethylene glycol (MIRALAX) 17 GM/Dose powder, Take 17 g (1 Capful) by mouth daily., Disp: 578 g, Rfl: 1    triamcinolone (KENALOG) 0.1 % external ointment, Apply topically 2 times daily. To red/itchy rash on body. Do not use to normal skin. Maximum 3-4 weeks to any area at a time. Please do not apply to face or skin folds., Disp: 454 g, Rfl: 0    PROBLEM LIST  Patient Active Problem List    Diagnosis Date Noted    Lichen planus 06/16/2021     Priority: Medium    Anorexia 06/16/2021     Priority: Medium    Encounter for screening for HIV 06/16/2021     Priority: Medium    Encounter for preventive measure 06/16/2021     Priority: Medium       PERTINENT PAST MEDICAL HISTORY:  Past Medical History:   Diagnosis Date    Lichen planus 06/16/2021       PREVIOUS SURGERIES:  Past Surgical History:   Procedure Laterality Date    NO HISTORY OF SURGERY         SOCIAL HISTORY:  Social History     Socioeconomic History    Marital status: Single     Spouse name: Not on file    Number of children: Not on file    Years of education: Not on file    Highest education level: Not on file   Occupational History    Not on file   Tobacco Use    Smoking status: Never    Smokeless tobacco: Never   Vaping Use    Vaping status: Never Used   Substance and Sexual Activity    Alcohol use: Not Currently    Drug use: Never    Sexual activity: Yes     Partners: Female   Other Topics Concern    Not on file   Social History Narrative    Not on file     Social Drivers of Health     Financial Resource Strain: Low Risk  (10/19/2023)    Financial Resource Strain     Within the past 12 months, have you or your family members you live with been unable to get utilities (heat, electricity) when it was really needed?: No   Food Insecurity: Low Risk  (10/19/2023)    Food Insecurity     Within the past 12 months, did you worry that your food would run out before you got money to buy more?: No     Within the past 12 months, did the food you bought just not last  and you didn t have money to get more?: No   Transportation Needs: Low Risk  (10/19/2023)    Transportation Needs     Within the past 12 months, has lack of transportation kept you from medical appointments, getting your medicines, non-medical meetings or appointments, work, or from getting things that you need?: No   Physical Activity: Not on file   Stress: Not on file   Social Connections: Not on file   Interpersonal Safety: Not on file   Housing Stability: Low Risk  (10/19/2023)    Housing Stability     Do you have housing? : Yes     Are you worried about losing your housing?: No       FAMILY HISTORY:  No family history on file.    Past/family/social history reviewed and no changes    PHYSICAL EXAMINATION:  Constitutional: aaox3, cooperative, pleasant, not dyspneic/diaphoretic, no acute distress  Vitals reviewed: /72 (BP Location: Left arm, Patient Position: Sitting, Cuff Size: Adult Regular)   Pulse 65   Wt 48.4 kg (106 lb 9.6 oz)   SpO2 99%   BMI 18.42 kg/m    Wt:   Wt Readings from Last 2 Encounters:   04/09/25 48.4 kg (106 lb 9.6 oz)   10/19/23 46 kg (101 lb 6.4 oz)      Eyes: Sclera anicteric/injected  CV: No edema  Respiratory: Unlabored breathing  Skin: warm, perfused, no jaundice  Psych: Normal affect  MSK: Normal gait

## 2025-04-10 LAB — IGA SERPL-MCNC: 271 MG/DL (ref 84–499)

## 2025-04-12 LAB
TTG IGA SER-ACNC: 0.5 U/ML
TTG IGG SER-ACNC: 0.6 U/ML

## 2025-04-16 ENCOUNTER — OFFICE VISIT (OUTPATIENT)
Dept: DERMATOLOGY | Facility: CLINIC | Age: 30
End: 2025-04-16
Payer: COMMERCIAL

## 2025-04-16 DIAGNOSIS — L40.9 PSORIASIS: Primary | ICD-10-CM

## 2025-04-16 LAB — CALPROTECTIN STL-MCNT: 169 MG/KG (ref 0–49.9)

## 2025-04-16 NOTE — PROGRESS NOTES
NB-UVB treatment for psoriasis (DX)  Areas being treated full body  Skin Type 4  Treatment # 15  Issues following previous treatment : none  Photoprotection on eyes, lips, nipples  636 mj  1 min 7 seconds today .   Mineral oil applied none (by patient, staff, or none)  Goal 2-3 times weekly for a total of 12 weeks or maintenance dosing for 12 weeks (if this time line has passed we need documentation of maintenance dosing )  Last treatment:  No complications     UVB treatment order placed Idania Marr PA-C.  Pt is due for follow up with provider 04/28/25    Lise Hooper CMA

## 2025-04-16 NOTE — LETTER
4/16/2025      Taylor Betancourt  [address]  [place] [address]      Dear Colleague,    Thank you for referring your patient, Taylor Betancourt, to the [facility]. Please see a copy of my visit note below.    NB-UVB treatment for psoriasis (DX)  Areas being treated full body  Skin Type 4  Treatment # 15  Issues following previous treatment : none  Photoprotection on eyes, lips, nipples  636 mj  1 min 7 seconds today .   Mineral oil applied none (by patient, staff, or none)  Goal 2-3 times weekly for a total of 12 weeks or maintenance dosing for 12 weeks (if this time line has passed we need documentation of maintenance dosing )  Last treatment:  No complications     UVB treatment order placed Idania Marr PA-C.  Pt is due for follow up with provider 04/28/25    Lise Hooper CMA            Again, thank you for allowing me to participate in the care of your patient.        Sincerely,        Yasemin Jameson PA-C    Electronically signed

## 2025-04-23 ENCOUNTER — OFFICE VISIT (OUTPATIENT)
Dept: DERMATOLOGY | Facility: CLINIC | Age: 30
End: 2025-04-23
Payer: COMMERCIAL

## 2025-04-23 DIAGNOSIS — L40.9 PSORIASIS: Primary | ICD-10-CM

## 2025-04-23 NOTE — PROGRESS NOTES
NB-UVB treatment for psoriasis (DX)  Areas being treated full body  Skin Type 4  Treatment # 16  Issues following previous treatment : none  Photoprotection on eyes, lips, nipples  695 mj  1 min 11 seconds today .   Mineral oil applied none (by patient, staff, or none)  Goal 2-3 times weekly for a total of 12 weeks or maintenance dosing for 12 weeks (if this time line has passed we need documentation of maintenance dosing )  Last treatment:  No complications     UVB treatment order placed Idania Marr PA-C.  Pt is due for follow up with provider 04/28/25    Jacquelyn Feldman LPN

## 2025-04-23 NOTE — LETTER
4/23/2025      Taylor Betancourt  [address]  [place] [address]      Dear Colleague,    Thank you for referring your patient, Taylor Betancourt, to the [facility]. Please see a copy of my visit note below.    NB-UVB treatment for psoriasis (DX)  Areas being treated full body  Skin Type 4  Treatment # 16  Issues following previous treatment : none  Photoprotection on eyes, lips, nipples  695 mj  1 min 11 seconds today .   Mineral oil applied none (by patient, staff, or none)  Goal 2-3 times weekly for a total of 12 weeks or maintenance dosing for 12 weeks (if this time line has passed we need documentation of maintenance dosing )  Last treatment:  No complications     UVB treatment order placed Idania Marr PA-C.  Pt is due for follow up with provider 04/28/25    Jacquelyn Feldman LPN      Again, thank you for allowing me to participate in the care of your patient.        Sincerely,        Yasemin Jameson PA-C    Electronically signed

## 2025-05-15 ENCOUNTER — OFFICE VISIT (OUTPATIENT)
Dept: DERMATOLOGY | Facility: CLINIC | Age: 30
End: 2025-05-15
Payer: COMMERCIAL

## 2025-05-15 DIAGNOSIS — L40.9 PSORIASIS: Primary | ICD-10-CM

## 2025-05-15 NOTE — LETTER
5/15/2025      Taylor Betancourt  [address]  [place] [address]      Dear Colleague,    Thank you for referring your patient, Taylor Betancourt, to the [facility]. Please see a copy of my visit note below.    [facility] Dermatology Note  Encounter Date: May 15, 2025  Office Visit     Reviewed patients past medical history and pertinent chart review prior to patients visit today.     Dermatology Problem List:  # Psoriasis, s/p biopsy from the right flank on 4/29/2022  - Otezla (pending insurance coverage), Vtama cream  - Prior tx: triamcinolone 0.1% ointment, narrowband phototherapy     Social hx: works as [employer]   ____________________________________________    Assessment & Plan:     # Psoriasis  -Condition and treatment options including topical steroid and nonsteroidal medications, phototherapy, Otezla, and Biologics. Discussed that this is an auto inflammatory disease and will be an ongoing condition that does not have a cure but we have several treatments for management of the condition. We will continue with the following plan as outlined below:     -Apremilast (Otezla) Risks and Benefits: The risks and benefits of apremilast were discussed in detail and the patient verbalized understanding.  The risks discussed include, but are not limited to, the risk for hypersensitivity, anaphylaxis, angioedema, depression, weight loss, diarrhea, nausea, headache, and infections such as upper respiratory tract infection.  I encouraged reviewing the package insert and asking any questions about the medication.  If patient gets diarrhea they will taper more slowly up in dosage until diarrhea subsides then resume full dosage. Advised diarrhea may last up to 2 months but that if it is tolerable it is not necessary to discontinue.     At this time we will plan to start apremilast (pending insurance coverage). His CMP is  within normal limits as of 4/9/2025. Patient was educated about the following dosage:    Dose: 10 mg in the morning on day 1. Titrate upward by additional 10 mg per day on days 2 to 5 as follows: Day 2: 10 mg twice daily; Day 3: 10 mg in the morning and 20 mg in the evening; Day 4: 20 mg twice daily; Day 5: 20 mg in the morning and 30 mg in the evening. Maintenance dose: 30 mg twice daily starting on day 6.    -Patient to begin applying topical tapinarof (vtama) 1% cream daily to the affected areas.    Return to clinic in 3 months for re-evaluation.    All risks, benefits and alternatives were discussed with patient.  Patient is in agreement and understands the assessment and plan.  All questions were answered.  Idania Jameson PA-C  [facility] Dermatology  _______________________________________    CC: Derm Problem (Follow up Pso )     HPI:  Mr. Taylor Betancourt is a(n) 30 year old male who presents today as a return patient for psoriasis.  He was last seen in dermatology on 1/27/2025 at which time phototherapy was recommended for psoriasis.  He was also instructed to apply triamcinolone 0.1% twice daily to the affected areas as needed.    He presents for follow-up today and reports no significant change with his rash.  He still continues to experience new lesions that pop up.  He has been performing phototherapy and started this at the end of January up until the end of April. He continues to use the triamcinolone ointment. He inquires about alternative options today.    He has no history of depression, malignancy, CHF, multiple sclerosis, or IBD. However, he does struggle with constipation and recently had an elevated fecal calprotectin lab obtained on 4/14/2025.  He is following with gastroenterology for this.    Patient is otherwise feeling well, without additional skin concerns.    Physical Exam:  SKIN: Focused examination of face, back, chest, neck, abdomen, upper extremities, lower extremities,  and buttocks was performed.  - back, chest, abdomen, neck, lower extremities, and upper extremities, well-demarcated scaly hyperpigmented papules and plaques     - No other lesions of concern on areas examined.     Medications:  Current Outpatient Medications   Medication Sig Dispense Refill     pantoprazole (PROTONIX) 40 MG EC tablet Take 1 tablet (40 mg) by mouth daily. 60 tablet 0     polyethylene glycol (MIRALAX) 17 GM/Dose powder Take 17 g (1 Capful) by mouth daily. 578 g 1     triamcinolone (KENALOG) 0.1 % external ointment Apply topically 2 times daily. To red/itchy rash on body. Do not use to normal skin. Maximum 3-4 weeks to any area at a time. Please do not apply to face or skin folds. 454 g 0     No current facility-administered medications for this visit.      Past Medical History:   Patient Active Problem List   Diagnosis     Lichen planus     Anorexia     Encounter for screening for HIV     Encounter for preventive measure     Past Medical History:   Diagnosis Date     Lichen planus 06/16/2021       CC Referred Self, MD  No address on file on close of this encounter.     Again, thank you for allowing me to participate in the care of your patient.        Sincerely,        Yasemin Jameson PA-C    Electronically signed

## 2025-05-15 NOTE — PROGRESS NOTES
[facility] Dermatology Note  Encounter Date: May 15, 2025  Office Visit     Reviewed patients past medical history and pertinent chart review prior to patients visit today.     Dermatology Problem List:  # Psoriasis, s/p biopsy from the right flank on 4/29/2022  - Otezla (pending insurance coverage), Vtama cream  - Prior tx: triamcinolone 0.1% ointment, narrowband phototherapy     Social hx: works as [employer]   ____________________________________________    Assessment & Plan:     # Psoriasis  -Condition and treatment options including topical steroid and nonsteroidal medications, phototherapy, Otezla, and Biologics. Discussed that this is an auto inflammatory disease and will be an ongoing condition that does not have a cure but we have several treatments for management of the condition. We will continue with the following plan as outlined below:     -Apremilast (Otezla) Risks and Benefits: The risks and benefits of apremilast were discussed in detail and the patient verbalized understanding.  The risks discussed include, but are not limited to, the risk for hypersensitivity, anaphylaxis, angioedema, depression, weight loss, diarrhea, nausea, headache, and infections such as upper respiratory tract infection.  I encouraged reviewing the package insert and asking any questions about the medication.  If patient gets diarrhea they will taper more slowly up in dosage until diarrhea subsides then resume full dosage. Advised diarrhea may last up to 2 months but that if it is tolerable it is not necessary to discontinue.     At this time we will plan to start apremilast (pending insurance coverage). His CMP is within normal limits as of 4/9/2025. Patient was educated about the following dosage:    Dose: 10 mg in the morning on day 1. Titrate upward by additional 10 mg per day on days 2 to 5 as follows: Day 2: 10 mg twice daily; Day 3: 10 mg in the morning and 20 mg in the evening; Day  4: 20 mg twice daily; Day 5: 20 mg in the morning and 30 mg in the evening. Maintenance dose: 30 mg twice daily starting on day 6.    -Patient to begin applying topical tapinarof (vtama) 1% cream daily to the affected areas.    Return to clinic in 3 months for re-evaluation.    All risks, benefits and alternatives were discussed with patient.  Patient is in agreement and understands the assessment and plan.  All questions were answered.  Idania Jamseon PA-C  [facility] Dermatology  _______________________________________    CC: Derm Problem (Follow up Pso )     HPI:  Mr. Taylor Betancourt is a(n) 30 year old male who presents today as a return patient for psoriasis.  He was last seen in dermatology on 1/27/2025 at which time phototherapy was recommended for psoriasis.  He was also instructed to apply triamcinolone 0.1% twice daily to the affected areas as needed.    He presents for follow-up today and reports no significant change with his rash.  He still continues to experience new lesions that pop up.  He has been performing phototherapy and started this at the end of January up until the end of April. He continues to use the triamcinolone ointment. He inquires about alternative options today.    He has no history of depression, malignancy, CHF, multiple sclerosis, or IBD. However, he does struggle with constipation and recently had an elevated fecal calprotectin lab obtained on 4/14/2025.  He is following with gastroenterology for this.    Patient is otherwise feeling well, without additional skin concerns.    Physical Exam:  SKIN: Focused examination of face, back, chest, neck, abdomen, upper extremities, lower extremities, and buttocks was performed.  - back, chest, abdomen, neck, lower extremities, and upper extremities, well-demarcated scaly hyperpigmented papules and plaques     - No other lesions of concern on areas examined.     Medications:  Current Outpatient Medications   Medication Sig  Dispense Refill    pantoprazole (PROTONIX) 40 MG EC tablet Take 1 tablet (40 mg) by mouth daily. 60 tablet 0    polyethylene glycol (MIRALAX) 17 GM/Dose powder Take 17 g (1 Capful) by mouth daily. 578 g 1    triamcinolone (KENALOG) 0.1 % external ointment Apply topically 2 times daily. To red/itchy rash on body. Do not use to normal skin. Maximum 3-4 weeks to any area at a time. Please do not apply to face or skin folds. 454 g 0     No current facility-administered medications for this visit.      Past Medical History:   Patient Active Problem List   Diagnosis    Lichen planus    Anorexia    Encounter for screening for HIV    Encounter for preventive measure     Past Medical History:   Diagnosis Date    Lichen planus 06/16/2021       CC Referred Self, MD  No address on file on close of this encounter.

## 2025-05-15 NOTE — PATIENT INSTRUCTIONS
-Apremilast (Otezla) Risks and Benefits: The risks and benefits of apremilast were discussed in detail and the patient verbalized understanding.  The risks discussed include, but are not limited to, the risk for hypersensitivity, anaphylaxis, angioedema, depression, weight loss, diarrhea, nausea, headache, and infections such as upper respiratory tract infection.  I encouraged reviewing the package insert and asking any questions about the medication.  If patient gets diarrhea they will taper more slowly up in dosage until diarrhea subsides then resume full dosage. Advised diarrhea may last up to 2 months but that if it is tolerable it is not necessary to discontinue.     At this time we will plan to start apremilast (pending insurance coverage). Patient was educated about the following dosage:    Dose: 10 mg in the morning on day 1. Titrate upward by additional 10 mg per day on days 2 to 5 as follows: Day 2: 10 mg twice daily; Day 3: 10 mg in the morning and 20 mg in the evening; Day 4: 20 mg twice daily; Day 5: 20 mg in the morning and 30 mg in the evening. Maintenance dose: 30 mg twice daily starting on day 6.

## 2025-05-16 ENCOUNTER — TELEPHONE (OUTPATIENT)
Dept: DERMATOLOGY | Facility: CLINIC | Age: 30
End: 2025-05-16
Payer: COMMERCIAL

## 2025-05-16 NOTE — TELEPHONE ENCOUNTER
PA Initiation    Medication: OTEZLA 10 & 20 & 30 MG PO TBPK  Insurance Company: [employer] - Phone [phone] Fax [phone]  Pharmacy Filling the Rx:    Filling Pharmacy Phone:    Filling Pharmacy Fax:    Start Date: 5/16/2025        Key: [id number]

## 2025-05-19 ENCOUNTER — TELEPHONE (OUTPATIENT)
Dept: DERMATOLOGY | Facility: CLINIC | Age: 30
End: 2025-05-19
Payer: COMMERCIAL

## 2025-05-19 NOTE — TELEPHONE ENCOUNTER
With much appreciation,  Karmen Cleaning, Pharmacy Technician   [facility], [facility] department

## 2025-05-19 NOTE — TELEPHONE ENCOUNTER
MTM referral from: [facility] visit (referral by provider)    MTM referral outreach attempt #2 on May 19, 2025 at 12:01 PM      Outcome: Patient not reachable after several attempts, routed to Pharmacist Team/Provider as an FYI    Use HB for the carrier/Plan on the flowsheet      [employer] Message Sent    Jamin Ann MTSALINAS       Patient called back and scheduled visit.     Adeline Leal CPhT  [facility]

## 2025-05-20 NOTE — TELEPHONE ENCOUNTER
PRIOR AUTHORIZATION DENIED    Medication: VTAMA 1 % EX CREA  Insurance Company: [employer] - Phone [phone] Fax [phone]  Denial Date: 5/20/2025  Denial Reason(s): Did not meet criteria for coverage        Appeal Information:     Patient Notified: NO

## 2025-05-20 NOTE — TELEPHONE ENCOUNTER
Insurance has approved loading dose of otezla however maintenance dose is currently pending. Will check back again in a few days for an update

## 2025-05-22 ENCOUNTER — VIRTUAL VISIT (OUTPATIENT)
Dept: PHARMACY | Facility: CLINIC | Age: 30
End: 2025-05-22
Attending: STUDENT IN AN ORGANIZED HEALTH CARE EDUCATION/TRAINING PROGRAM
Payer: COMMERCIAL

## 2025-05-22 DIAGNOSIS — L40.9 PSORIASIS: Primary | ICD-10-CM

## 2025-05-22 NOTE — Clinical Note
I will submit an appeal for Vtama cream (not preferred by insurance) - otherwise let me know if there is another topical you'd rather try. In the final stages of Otezla coverage so he should hopefully be able to start this soon   Emmie

## 2025-05-22 NOTE — PROGRESS NOTES
Medication Therapy Management (MTM) Encounter    ASSESSMENT:                            Psoriasis:   Flaring. Dermatology recommended initiation of Otezla (apremilast) and Vtama topical cream. Will work on securing coverage of these. Explained fill process for Otezla. Provided education on Otezla today including dosing, administration, potential side effects, precautions, and time to response    PLAN:                            MTM will submit an appeal for Vtama cream & update patient on the outcome of this     Otezla starter pack approved. Awaiting PA approval for maintenance dose then will be released to [facility] (P#: [phone])     Pending delivery: Start Otezla (apremilast)   Day 1: Take 10 mg in the morning   Day 2: Take 10 mg in the morning and 10 mg in the evening  Day 3: Take 10 mg in the morning and 20 mg in the evening  Day 4: Take 20 mg in the morning and 20 mg in the evening  Day 5: Take 20 mg in the morning and 30 mg in the evening  Day 6 (and thereafter): Take 30 mg twice daily for maintenance dose.     Follow-up: with me (Emmie Borrego [facility]) for an MTM follow up appointment by phone on 8/20/25 at 2:30pm     SUBJECTIVE/OBJECTIVE:                          Taylor Betancourt is a 30 year old male called for an initial visit. He was referred to me from Yasemin Jameson PA-C.      Reason for visit: Otezla (apremilast) initiation.    Medication Adherence/Access:   Otezla coverage:   - PA approved for load; awaiting approval for maintenance dose   Vtama Coverage:   - PA denied.     Psoriasis:   - Otezla (apremilast) 30 mg tablet twice daily following starter pack (initiation pending)  -  tapinarof (vtama) 1% cream daily to the affected areas    No side effects with treatment at this time.     Symptoms: plaques on back, chest, abdomen, neck, lower extremities, and upper extremities .    Specialist: Yasemin Jameson PA-C, Dermatology. Last visit on 5/15/25.     Previous treatment:   -  Phototherapy - not effective   - Triamcinolone - not effective     Allergies/ADRs: Reviewed in chart  Past Medical History: Reviewed in chart  Tobacco: He reports that he has never smoked. He has never used smokeless tobacco.  Alcohol use: rarely; socially for holidays   ----------------    I spent 17 minutes with this patient today. All changes were made via collaborative practice agreement with Yasemin Jameson.     A summary of these recommendations was sent via [employer].    Emmie Borrego, PharmD  Medication Therapy Management Pharmacist   [facility]     Telemedicine Visit Details  The patient's medications can be safely assessed via a telemedicine encounter.  Type of service:  Telephone visit  Originating Location (pt. Location): Home    Distant Location (provider location):  Off-site  Start Time: 11:00am  End Time: 11:17am     Medication Therapy Recommendations  No medication therapy recommendations to display

## 2025-05-22 NOTE — PATIENT INSTRUCTIONS
"Recommendations from today's MT visit:                                                      I will submit an appeal for Vtama cream & update you on the outcome of this     Otezla starter pack approved. Awaiting approval for maintenance dose then will release this to specialty pharmacy to fill & update you by Manny message     Once approved will need to set up delivery of Otezla with [facility] (P#: [phone]).   You can ask them to ship this to a nearby [facility] retail pharmacy vs your home if you'd prefer     Pending delivery: Start Otezla (apremilast) as directed below     Dose: Otezla is a tablet that you take by mouth. I recommend that you take this with food to reduce stomach related side effects. The dose of the Otezla is titrated over 6 days to help improve your tolerance to medication & minimize side effects.   Day 1: Take 10 mg in the morning   Day 2: Take 10 mg in the morning and 10 mg in the evening  Day 3: Take 10 mg in the morning and 20 mg in the evening  Day 4: Take 20 mg in the morning and 20 mg in the evening  Day 5: Take 20 mg in the morning and 30 mg in the evening  Day 6 (and thereafter): Take 30 mg twice daily for maintenance dose.     Note: You will receive a \"starter pack\" for the Otezla to help you titrate your dose. This includes 10 mg, 20 mg, and 30 mg tablets. Once you've reached your maintenance dose you will receive 30 mg tablets moving forward.     Potential Adverse Effects:   Please reach out to the clinic right away if you experience any new or worsening depression, suicidal thoughts, or unusual changes in behavior.   More common potential side effects include: weight loss, diarrhea, nausea, vomiting. Please reach out if these are severe & bothersome, however as you begin to tolerate the medication over time these side effects may resolve.   Other potential side effects include: headache, upper respiratory infection, cough/cold, abdominal pain, indigestion, " decreased appetite, and fatigue    Follow-up: with me (Emmie Borrego [facility]) for an MTM follow up appointment by phone on 8/20/25 at 2:30pm     My Clinical Pharmacist's contact information:                                                      Please feel free to contact me with any questions or concerns you have.      Emmie Borrego, PharmD  MTM Pharmacist  [facility]

## 2025-05-23 NOTE — TELEPHONE ENCOUNTER
Message out to Yasemin Jameson PA-C to confirm if we can try one of the preferred topicals by insurance. Denial letter indicates patient must try all of the preferred topicals before coverage would be considered for Vtama: (a) calcipotriene cream, (b) tacrolimus cream , (c) pimcerolimus cream, and (d) one topical steroid (tried)     Emmie Borrego, PharmD  MTM Pharmacist

## 2025-05-27 NOTE — TELEPHONE ENCOUNTER
Prior Authorization Approval    Medication: OTEZLA 10 & 20 & 30 MG PO TBPK  Authorization Effective Date: 5/20/2025  Authorization Expiration Date: 8/16/2025  Approved Dose/Quantity: starter dose  Reference #: Key: [id number]   Insurance Company: [employer] [phone] Fax [phone]  Expected CoPay: $ 25  CoPay Card Available:      Financial Assistance Needed:   Which Pharmacy is filling the prescription:    Pharmacy Notified: Yes  Patient Notified: Yes

## 2025-05-29 NOTE — TELEPHONE ENCOUNTER
Dermatologist approved of switching to tacrolimus ointment. Canceling orders for Vtama cream and closing encounter.     Emmie Borrego, PharmD  MTM Pharmacist

## 2025-06-16 ENCOUNTER — PHARMACY VISIT (OUTPATIENT)
Dept: ADMINISTRATIVE | Facility: CLINIC | Age: 30
End: 2025-06-16
Payer: COMMERCIAL

## 2025-06-16 NOTE — PROGRESS NOTES
"   Psoriasis Clinical Follow Up Assessment    Activity Date 2025/06/16     Activity Medications    OTEZLA 10 & 20 & 30MG TBPK    Summary Notes   Spoke with patient for therapy management assessment.     Current Regimen: Otezla ( **PATIENT STOPPED TAKING ** )     Adherence: Started last week, but only took 3 days worth of doses     Tolerability: He did not tolerate med well. Since first dose, he reports having headaches, and it made him feel run down and tired, which would even affect his vision and concentration. He is an [employer] , so he did not feel safe taking Otezla anymore, so he stopped.   I asked if he ever let the doctor know this, and he has not yet, but said he would do it now (via [employer]).     Med/Allergy Changes: None.     *Psoriasis*: Notes from initial : \"He has been dealing with psoriasis for a very long time and his current treatments are not working very well for him. He describes his psoriasis as \"mild\" but it covers 80% of his body with his legs and back being the worst areas. He reports that when the weather is hot his psoriasis worsens.\"     Follow-Up: He will reach out to clinic to let them know, and hopefully try something else.    Flavio Horn, PharmD  Therapy Management Pharmacist  [facility]       "